# Patient Record
Sex: MALE | Race: WHITE | Employment: FULL TIME | URBAN - METROPOLITAN AREA
[De-identification: names, ages, dates, MRNs, and addresses within clinical notes are randomized per-mention and may not be internally consistent; named-entity substitution may affect disease eponyms.]

---

## 2017-01-06 ENCOUNTER — TELEPHONE (OUTPATIENT)
Dept: CARDIAC CATH/INVASIVE PROCEDURES | Age: 62
End: 2017-01-06

## 2017-01-09 ENCOUNTER — TELEPHONE (OUTPATIENT)
Dept: CARDIAC CATH/INVASIVE PROCEDURES | Age: 62
End: 2017-01-09

## 2017-01-10 ENCOUNTER — HOSPITAL ENCOUNTER (OUTPATIENT)
Dept: SURGERY | Age: 62
Discharge: HOME | End: 2017-01-10
Attending: INTERNAL MEDICINE | Admitting: INTERNAL MEDICINE

## 2017-01-10 ENCOUNTER — HOSPITAL ENCOUNTER (OUTPATIENT)
Dept: CARDIAC CATH/INVASIVE PROCEDURES | Age: 62
Discharge: HOME | End: 2017-01-10
Admitting: INTERNAL MEDICINE

## 2017-01-10 VITALS — BODY MASS INDEX: 24.91 KG/M2 | HEIGHT: 70 IN | WEIGHT: 174 LBS

## 2017-01-10 DIAGNOSIS — C90.00 MULTIPLE MYELOMA, REMISSION STATUS UNSPECIFIED (HCC): ICD-10-CM

## 2017-01-10 LAB
INR BLD: 0.95 (ref 0.85–1.16)
PROTHROMBIN TIME: 10.8 SEC (ref 9.8–13)

## 2017-01-10 RX ORDER — MAGNESIUM HYDROXIDE 1200 MG/15ML
150 LIQUID ORAL ONCE
Status: DISCONTINUED | OUTPATIENT
Start: 2017-01-10 | End: 2017-01-10 | Stop reason: CLARIF

## 2017-01-10 RX ORDER — FENTANYL CITRATE 50 UG/ML
50 INJECTION, SOLUTION INTRAMUSCULAR; INTRAVENOUS ONCE
Status: COMPLETED | OUTPATIENT
Start: 2017-01-10 | End: 2017-01-10

## 2017-01-10 RX ORDER — MIDAZOLAM HYDROCHLORIDE 1 MG/ML
1 INJECTION INTRAMUSCULAR; INTRAVENOUS ONCE
Status: COMPLETED | OUTPATIENT
Start: 2017-01-10 | End: 2017-01-10

## 2017-01-10 RX ADMIN — MIDAZOLAM HYDROCHLORIDE 1 MG: 1 INJECTION INTRAMUSCULAR; INTRAVENOUS at 13:45

## 2017-01-10 RX ADMIN — FENTANYL CITRATE 50 MCG: 50 INJECTION, SOLUTION INTRAMUSCULAR; INTRAVENOUS at 13:45

## 2017-01-10 ASSESSMENT — PAIN SCALES - GENERAL
PAINLEVEL_OUTOF10: 6
PAINLEVEL_OUTOF10: 6

## 2017-01-10 ASSESSMENT — PAIN DESCRIPTION - LOCATION: LOCATION: BACK

## 2017-01-11 ENCOUNTER — TELEPHONE (OUTPATIENT)
Dept: CARDIAC CATH/INVASIVE PROCEDURES | Age: 62
End: 2017-01-11

## 2017-01-19 RX ORDER — ONDANSETRON 2 MG/ML
8 INJECTION INTRAMUSCULAR; INTRAVENOUS ONCE
Status: CANCELLED
Start: 2017-01-19 | End: 2017-01-19

## 2017-01-31 ENCOUNTER — OFFICE VISIT (OUTPATIENT)
Dept: ENT CLINIC | Age: 62
End: 2017-01-31

## 2017-01-31 VITALS
TEMPERATURE: 98.4 F | BODY MASS INDEX: 24.34 KG/M2 | DIASTOLIC BLOOD PRESSURE: 74 MMHG | HEIGHT: 70 IN | WEIGHT: 170 LBS | SYSTOLIC BLOOD PRESSURE: 120 MMHG

## 2017-01-31 DIAGNOSIS — H90.2 MIDDLE EAR CONDUCTIVE HEARING LOSS: ICD-10-CM

## 2017-01-31 DIAGNOSIS — H65.92 MIDDLE EAR EFFUSION, LEFT: ICD-10-CM

## 2017-01-31 PROCEDURE — 69433 CREATE EARDRUM OPENING: CPT | Performed by: OTOLARYNGOLOGY

## 2017-02-09 ENCOUNTER — TELEPHONE (OUTPATIENT)
Dept: ENT CLINIC | Age: 62
End: 2017-02-09

## 2017-02-09 DIAGNOSIS — H65.192 OTHER ACUTE NONSUPPURATIVE OTITIS MEDIA OF LEFT EAR: Primary | ICD-10-CM

## 2017-02-10 DIAGNOSIS — H65.192 OTHER ACUTE NONSUPPURATIVE OTITIS MEDIA OF LEFT EAR: ICD-10-CM

## 2017-02-23 ENCOUNTER — TELEPHONE (OUTPATIENT)
Dept: ENT CLINIC | Age: 62
End: 2017-02-23

## 2017-02-23 ENCOUNTER — OFFICE VISIT (OUTPATIENT)
Dept: ENT CLINIC | Age: 62
End: 2017-02-23

## 2017-02-23 DIAGNOSIS — H92.12 PURULENT DRAINAGE THROUGH EAR TUBE, LEFT: ICD-10-CM

## 2017-02-23 DIAGNOSIS — H90.2 MIDDLE EAR CONDUCTIVE HEARING LOSS: ICD-10-CM

## 2017-02-23 DIAGNOSIS — H65.92 MIDDLE EAR EFFUSION, LEFT: Primary | ICD-10-CM

## 2017-02-23 PROCEDURE — 99212 OFFICE O/P EST SF 10 MIN: CPT | Performed by: OTOLARYNGOLOGY

## 2017-02-24 ENCOUNTER — OFFICE VISIT (OUTPATIENT)
Dept: ENT CLINIC | Age: 62
End: 2017-02-24

## 2017-02-24 DIAGNOSIS — H65.92 MIDDLE EAR EFFUSION, LEFT: Primary | ICD-10-CM

## 2017-02-24 PROCEDURE — 99024 POSTOP FOLLOW-UP VISIT: CPT | Performed by: OTOLARYNGOLOGY

## 2017-02-27 ENCOUNTER — TELEPHONE (OUTPATIENT)
Dept: ENT CLINIC | Age: 62
End: 2017-02-27

## 2017-03-09 PROBLEM — J32.1 SINUSITIS CHRONIC, FRONTAL: Status: ACTIVE | Noted: 2017-03-09

## 2017-03-09 PROBLEM — Z00.6 EXAMINATION OF PARTICIPANT IN CLINICAL TRIAL: Status: ACTIVE | Noted: 2017-03-09

## 2017-03-10 ENCOUNTER — OFFICE VISIT (OUTPATIENT)
Dept: PULMONOLOGY | Age: 62
End: 2017-03-10

## 2017-03-10 ENCOUNTER — HOSPITAL ENCOUNTER (OUTPATIENT)
Dept: CT IMAGING | Age: 62
Discharge: HOME | End: 2017-03-10
Attending: INTERNAL MEDICINE | Admitting: INTERNAL MEDICINE

## 2017-03-10 VITALS
BODY MASS INDEX: 23.8 KG/M2 | SYSTOLIC BLOOD PRESSURE: 160 MMHG | DIASTOLIC BLOOD PRESSURE: 92 MMHG | HEART RATE: 113 BPM | RESPIRATION RATE: 16 BRPM | OXYGEN SATURATION: 100 % | WEIGHT: 170 LBS | HEIGHT: 71 IN

## 2017-03-10 DIAGNOSIS — J32.1 CHRONIC FRONTAL SINUSITIS: ICD-10-CM

## 2017-03-10 DIAGNOSIS — C90.00 MULTIPLE MYELOMA, WITHOUT MENTION OF HAVING ACHIEVED REMISSION: ICD-10-CM

## 2017-03-10 DIAGNOSIS — Z94.81 STATUS POST AUTOLOGOUS BONE MARROW TRANSPLANTATION (HCC): ICD-10-CM

## 2017-03-10 DIAGNOSIS — J32.1 SINUSITIS CHRONIC, FRONTAL: ICD-10-CM

## 2017-03-10 DIAGNOSIS — J21.9 BRONCHIOLITIS: Primary | ICD-10-CM

## 2017-03-10 PROCEDURE — 99213 OFFICE O/P EST LOW 20 MIN: CPT | Performed by: INTERNAL MEDICINE

## 2017-03-10 RX ORDER — ALBUTEROL SULFATE 90 UG/1
2 AEROSOL, METERED RESPIRATORY (INHALATION) EVERY 4 HOURS PRN
Qty: 1 INHALER | Refills: 5 | Status: SHIPPED | OUTPATIENT
Start: 2017-03-10 | End: 2018-04-20

## 2017-03-10 RX ORDER — INHALER, ASSIST DEVICES
SPACER (EA) MISCELLANEOUS
Qty: 1 EACH | Refills: 3 | Status: SHIPPED | OUTPATIENT
Start: 2017-03-10 | End: 2018-01-01 | Stop reason: SDUPTHER

## 2017-04-11 ENCOUNTER — HOSPITAL ENCOUNTER (OUTPATIENT)
Dept: ONCOLOGY | Age: 62
Discharge: HOME | End: 2017-04-30
Attending: INTERNAL MEDICINE | Admitting: INTERNAL MEDICINE

## 2017-04-11 VITALS
DIASTOLIC BLOOD PRESSURE: 95 MMHG | RESPIRATION RATE: 20 BRPM | HEART RATE: 93 BPM | TEMPERATURE: 98 F | SYSTOLIC BLOOD PRESSURE: 139 MMHG | OXYGEN SATURATION: 99 %

## 2017-04-11 LAB
BASOPHILS ABSOLUTE: 0 K/UL (ref 0–0.2)
BASOPHILS RELATIVE PERCENT: 1.1 %
EOSINOPHILS ABSOLUTE: 0 K/UL (ref 0–0.6)
EOSINOPHILS RELATIVE PERCENT: 1.4 %
HCT VFR BLD CALC: 35.3 % (ref 40.5–52.5)
HEMOGLOBIN: 12.1 G/DL (ref 13.5–17.5)
LYMPHOCYTES ABSOLUTE: 0.1 K/UL (ref 1–5.1)
LYMPHOCYTES RELATIVE PERCENT: 8.1 %
MCH RBC QN AUTO: 37.5 PG (ref 26–34)
MCHC RBC AUTO-ENTMCNC: 34.3 G/DL (ref 31–36)
MCV RBC AUTO: 109.3 FL (ref 80–100)
MONOCYTES ABSOLUTE: 0.3 K/UL (ref 0–1.3)
MONOCYTES RELATIVE PERCENT: 21.3 %
NEUTROPHILS ABSOLUTE: 1.1 K/UL (ref 1.7–7.7)
NEUTROPHILS RELATIVE PERCENT: 68.1 %
PDW BLD-RTO: 18.4 % (ref 12.4–15.4)
PLATELET # BLD: 61 K/UL (ref 135–450)
PMV BLD AUTO: 7.3 FL (ref 5–10.5)
RBC # BLD: 3.23 M/UL (ref 4.2–5.9)
WBC # BLD: 1.6 K/UL (ref 4–11)

## 2017-04-11 RX ORDER — LIDOCAINE HYDROCHLORIDE 10 MG/ML
5 INJECTION, SOLUTION EPIDURAL; INFILTRATION; INTRACAUDAL; PERINEURAL ONCE
Status: COMPLETED | OUTPATIENT
Start: 2017-04-11 | End: 2017-04-11

## 2017-04-11 RX ORDER — FENTANYL CITRATE 50 UG/ML
50 INJECTION, SOLUTION INTRAMUSCULAR; INTRAVENOUS ONCE
Status: COMPLETED | OUTPATIENT
Start: 2017-04-11 | End: 2017-04-11

## 2017-04-11 RX ORDER — MIDAZOLAM HYDROCHLORIDE 1 MG/ML
2 INJECTION INTRAMUSCULAR; INTRAVENOUS ONCE
Status: COMPLETED | OUTPATIENT
Start: 2017-04-11 | End: 2017-04-11

## 2017-04-11 RX ORDER — FLUMAZENIL 0.1 MG/ML
0.5 INJECTION, SOLUTION INTRAVENOUS PRN
Status: DISCONTINUED | OUTPATIENT
Start: 2017-04-11 | End: 2017-05-01 | Stop reason: HOSPADM

## 2017-04-11 RX ORDER — NALOXONE HYDROCHLORIDE 0.4 MG/ML
0.4 INJECTION, SOLUTION INTRAMUSCULAR; INTRAVENOUS; SUBCUTANEOUS PRN
Status: DISCONTINUED | OUTPATIENT
Start: 2017-04-11 | End: 2017-05-01 | Stop reason: HOSPADM

## 2017-04-11 RX ADMIN — MIDAZOLAM HYDROCHLORIDE 2 MG: 1 INJECTION INTRAMUSCULAR; INTRAVENOUS at 12:37

## 2017-04-11 RX ADMIN — FENTANYL CITRATE 50 MCG: 50 INJECTION, SOLUTION INTRAMUSCULAR; INTRAVENOUS at 12:40

## 2017-04-11 RX ADMIN — LIDOCAINE HYDROCHLORIDE 5 ML: 10 INJECTION, SOLUTION EPIDURAL; INFILTRATION; INTRACAUDAL; PERINEURAL at 12:40

## 2017-04-11 ASSESSMENT — PAIN SCALES - GENERAL: PAINLEVEL_OUTOF10: 0

## 2017-05-15 ENCOUNTER — HOSPITAL ENCOUNTER (OUTPATIENT)
Dept: OTHER | Age: 62
Discharge: HOME | End: 2017-05-15
Attending: NURSE PRACTITIONER | Admitting: NURSE PRACTITIONER

## 2017-05-15 DIAGNOSIS — R05.9 COUGH: ICD-10-CM

## 2017-05-15 DIAGNOSIS — R06.02 SOB (SHORTNESS OF BREATH): ICD-10-CM

## 2017-05-16 ENCOUNTER — OFFICE VISIT (OUTPATIENT)
Dept: PULMONOLOGY | Age: 62
End: 2017-05-16

## 2017-05-16 VITALS
DIASTOLIC BLOOD PRESSURE: 70 MMHG | WEIGHT: 163 LBS | RESPIRATION RATE: 16 BRPM | OXYGEN SATURATION: 96 % | BODY MASS INDEX: 23.34 KG/M2 | HEIGHT: 70 IN | SYSTOLIC BLOOD PRESSURE: 104 MMHG | HEART RATE: 106 BPM

## 2017-05-16 DIAGNOSIS — C90.01 MULTIPLE MYELOMA IN REMISSION (HCC): ICD-10-CM

## 2017-05-16 DIAGNOSIS — J21.9 BRONCHIOLITIS: Primary | ICD-10-CM

## 2017-05-16 DIAGNOSIS — C90.00 MULTIPLE MYELOMA, WITHOUT MENTION OF HAVING ACHIEVED REMISSION: ICD-10-CM

## 2017-05-16 DIAGNOSIS — G47.33 OSA (OBSTRUCTIVE SLEEP APNEA): Chronic | ICD-10-CM

## 2017-05-16 PROCEDURE — 99214 OFFICE O/P EST MOD 30 MIN: CPT | Performed by: INTERNAL MEDICINE

## 2017-05-16 RX ORDER — PREDNISONE 10 MG/1
TABLET ORAL
Qty: 100 TABLET | Refills: 3 | Status: ON HOLD | OUTPATIENT
Start: 2017-05-16 | End: 2018-01-01 | Stop reason: HOSPADM

## 2017-05-31 ENCOUNTER — OFFICE VISIT (OUTPATIENT)
Dept: PSYCHIATRY | Age: 62
End: 2017-05-31

## 2017-05-31 VITALS
HEART RATE: 110 BPM | OXYGEN SATURATION: 99 % | DIASTOLIC BLOOD PRESSURE: 80 MMHG | BODY MASS INDEX: 23.99 KG/M2 | SYSTOLIC BLOOD PRESSURE: 150 MMHG | HEIGHT: 70 IN | WEIGHT: 167.6 LBS

## 2017-05-31 DIAGNOSIS — G47.00 INSOMNIA, UNSPECIFIED TYPE: ICD-10-CM

## 2017-05-31 DIAGNOSIS — F43.23 ADJUSTMENT DISORDER WITH MIXED ANXIETY AND DEPRESSED MOOD: Primary | ICD-10-CM

## 2017-05-31 PROCEDURE — 99204 OFFICE O/P NEW MOD 45 MIN: CPT | Performed by: PSYCHIATRY & NEUROLOGY

## 2017-05-31 RX ORDER — BUPROPION HYDROCHLORIDE 100 MG/1
100 TABLET ORAL 2 TIMES DAILY
Qty: 60 TABLET | Refills: 1 | Status: SHIPPED | OUTPATIENT
Start: 2017-05-31 | End: 2017-06-28 | Stop reason: SDUPTHER

## 2017-06-01 ENCOUNTER — OFFICE VISIT (OUTPATIENT)
Dept: PULMONOLOGY | Age: 62
End: 2017-06-01

## 2017-06-01 VITALS
HEART RATE: 118 BPM | DIASTOLIC BLOOD PRESSURE: 80 MMHG | SYSTOLIC BLOOD PRESSURE: 142 MMHG | OXYGEN SATURATION: 97 % | WEIGHT: 166.8 LBS | BODY MASS INDEX: 23.88 KG/M2 | HEIGHT: 70 IN | RESPIRATION RATE: 18 BRPM

## 2017-06-01 DIAGNOSIS — G47.33 OSA (OBSTRUCTIVE SLEEP APNEA): Chronic | ICD-10-CM

## 2017-06-01 DIAGNOSIS — J21.9 BRONCHIOLITIS: Primary | ICD-10-CM

## 2017-06-01 DIAGNOSIS — Z94.81 STATUS POST AUTOLOGOUS BONE MARROW TRANSPLANTATION (HCC): ICD-10-CM

## 2017-06-01 DIAGNOSIS — C90.01 MULTIPLE MYELOMA IN REMISSION (HCC): ICD-10-CM

## 2017-06-01 PROCEDURE — 99214 OFFICE O/P EST MOD 30 MIN: CPT | Performed by: INTERNAL MEDICINE

## 2017-06-01 RX ORDER — FLUTICASONE FUROATE AND VILANTEROL 100; 25 UG/1; UG/1
1 POWDER RESPIRATORY (INHALATION) DAILY
Qty: 3 EACH | Refills: 3 | Status: SHIPPED | OUTPATIENT
Start: 2017-06-01 | End: 2017-08-04 | Stop reason: SDUPTHER

## 2017-06-01 RX ORDER — MOMETASONE FUROATE 50 UG/1
2 SPRAY, METERED NASAL 2 TIMES DAILY
Qty: 3 INHALER | Refills: 3 | Status: SHIPPED | OUTPATIENT
Start: 2017-06-01 | End: 2017-08-24 | Stop reason: SDUPTHER

## 2017-06-08 ENCOUNTER — PATIENT MESSAGE (OUTPATIENT)
Dept: PSYCHIATRY | Age: 62
End: 2017-06-08

## 2017-06-09 RX ORDER — AZITHROMYCIN 500 MG/1
500 TABLET, FILM COATED ORAL DAILY
Qty: 6 TABLET | Refills: 0 | Status: SHIPPED | OUTPATIENT
Start: 2017-06-09 | End: 2017-06-15 | Stop reason: ALTCHOICE

## 2017-06-14 ENCOUNTER — TELEPHONE (OUTPATIENT)
Dept: PULMONOLOGY | Age: 62
End: 2017-06-14

## 2017-06-15 ENCOUNTER — TELEPHONE (OUTPATIENT)
Dept: PULMONOLOGY | Age: 62
End: 2017-06-15

## 2017-06-20 ENCOUNTER — OFFICE VISIT (OUTPATIENT)
Dept: CARDIOLOGY CLINIC | Age: 62
End: 2017-06-20

## 2017-06-20 VITALS
SYSTOLIC BLOOD PRESSURE: 144 MMHG | HEIGHT: 70 IN | DIASTOLIC BLOOD PRESSURE: 94 MMHG | HEART RATE: 116 BPM | WEIGHT: 168 LBS | BODY MASS INDEX: 24.05 KG/M2

## 2017-06-20 DIAGNOSIS — E78.00 HYPERCHOLESTEREMIA: Chronic | ICD-10-CM

## 2017-06-20 DIAGNOSIS — R06.02 SOB (SHORTNESS OF BREATH): ICD-10-CM

## 2017-06-20 DIAGNOSIS — I10 ESSENTIAL HYPERTENSION: Primary | ICD-10-CM

## 2017-06-20 DIAGNOSIS — I10 ESSENTIAL HYPERTENSION: Chronic | ICD-10-CM

## 2017-06-20 PROCEDURE — 93000 ELECTROCARDIOGRAM COMPLETE: CPT | Performed by: INTERNAL MEDICINE

## 2017-06-20 PROCEDURE — 99205 OFFICE O/P NEW HI 60 MIN: CPT | Performed by: INTERNAL MEDICINE

## 2017-06-21 ENCOUNTER — OFFICE VISIT (OUTPATIENT)
Dept: PULMONOLOGY | Age: 62
End: 2017-06-21

## 2017-06-21 VITALS
DIASTOLIC BLOOD PRESSURE: 60 MMHG | OXYGEN SATURATION: 97 % | BODY MASS INDEX: 24.25 KG/M2 | HEIGHT: 70 IN | RESPIRATION RATE: 20 BRPM | SYSTOLIC BLOOD PRESSURE: 142 MMHG | WEIGHT: 169.4 LBS | HEART RATE: 126 BPM

## 2017-06-21 DIAGNOSIS — Z94.81 STATUS POST AUTOLOGOUS BONE MARROW TRANSPLANTATION (HCC): ICD-10-CM

## 2017-06-21 DIAGNOSIS — J21.9 BRONCHIOLITIS: Primary | ICD-10-CM

## 2017-06-21 DIAGNOSIS — C90.00 MULTIPLE MYELOMA, WITHOUT MENTION OF HAVING ACHIEVED REMISSION: ICD-10-CM

## 2017-06-21 PROCEDURE — 99213 OFFICE O/P EST LOW 20 MIN: CPT | Performed by: INTERNAL MEDICINE

## 2017-06-28 ENCOUNTER — OFFICE VISIT (OUTPATIENT)
Dept: PSYCHIATRY | Age: 62
End: 2017-06-28

## 2017-06-28 ENCOUNTER — HOSPITAL ENCOUNTER (OUTPATIENT)
Dept: CT IMAGING | Age: 62
Discharge: HOME | End: 2017-06-28
Attending: INTERNAL MEDICINE | Admitting: INTERNAL MEDICINE

## 2017-06-28 VITALS
SYSTOLIC BLOOD PRESSURE: 140 MMHG | HEIGHT: 70 IN | DIASTOLIC BLOOD PRESSURE: 78 MMHG | BODY MASS INDEX: 24.37 KG/M2 | HEART RATE: 112 BPM | WEIGHT: 170.2 LBS

## 2017-06-28 DIAGNOSIS — F43.23 ADJUSTMENT DISORDER WITH MIXED ANXIETY AND DEPRESSED MOOD: Primary | ICD-10-CM

## 2017-06-28 DIAGNOSIS — G47.00 INSOMNIA, UNSPECIFIED TYPE: ICD-10-CM

## 2017-06-28 DIAGNOSIS — J21.9 BRONCHIOLITIS: ICD-10-CM

## 2017-06-28 DIAGNOSIS — J21.9 ACUTE BRONCHIOLITIS: ICD-10-CM

## 2017-06-28 PROCEDURE — 99214 OFFICE O/P EST MOD 30 MIN: CPT | Performed by: PSYCHIATRY & NEUROLOGY

## 2017-06-28 RX ORDER — BUPROPION HYDROCHLORIDE 100 MG/1
100 TABLET ORAL 2 TIMES DAILY
Qty: 60 TABLET | Refills: 1 | Status: SHIPPED | OUTPATIENT
Start: 2017-06-28 | End: 2017-08-01 | Stop reason: SDUPTHER

## 2017-06-29 ENCOUNTER — TELEPHONE (OUTPATIENT)
Dept: CARDIOLOGY CLINIC | Age: 62
End: 2017-06-29

## 2017-06-29 ENCOUNTER — OFFICE VISIT (OUTPATIENT)
Dept: PULMONOLOGY | Age: 62
End: 2017-06-29

## 2017-06-29 ENCOUNTER — HOSPITAL ENCOUNTER (OUTPATIENT)
Dept: NON INVASIVE DIAGNOSTICS | Age: 62
Discharge: HOME | End: 2017-06-29
Attending: FAMILY MEDICINE | Admitting: INTERNAL MEDICINE

## 2017-06-29 VITALS
SYSTOLIC BLOOD PRESSURE: 142 MMHG | OXYGEN SATURATION: 97 % | BODY MASS INDEX: 24.17 KG/M2 | HEART RATE: 115 BPM | HEIGHT: 70 IN | RESPIRATION RATE: 18 BRPM | DIASTOLIC BLOOD PRESSURE: 70 MMHG | WEIGHT: 168.8 LBS

## 2017-06-29 DIAGNOSIS — E78.00 HYPERCHOLESTEREMIA: ICD-10-CM

## 2017-06-29 DIAGNOSIS — J21.9 BRONCHIOLITIS: Primary | ICD-10-CM

## 2017-06-29 DIAGNOSIS — R06.02 SOB (SHORTNESS OF BREATH): ICD-10-CM

## 2017-06-29 DIAGNOSIS — I10 ESSENTIAL (PRIMARY) HYPERTENSION: ICD-10-CM

## 2017-06-29 DIAGNOSIS — I10 ESSENTIAL HYPERTENSION: ICD-10-CM

## 2017-06-29 DIAGNOSIS — C90.01 MULTIPLE MYELOMA IN REMISSION (HCC): ICD-10-CM

## 2017-06-29 LAB
LV EF: 63 %
LV EF: 64 %
LVEF MODALITY: NORMAL
LVEF MODALITY: NORMAL

## 2017-06-29 PROCEDURE — 99214 OFFICE O/P EST MOD 30 MIN: CPT | Performed by: INTERNAL MEDICINE

## 2017-07-05 ENCOUNTER — OFFICE VISIT (OUTPATIENT)
Dept: CARDIOLOGY CLINIC | Age: 62
End: 2017-07-05

## 2017-07-05 VITALS
HEART RATE: 119 BPM | WEIGHT: 167.8 LBS | DIASTOLIC BLOOD PRESSURE: 80 MMHG | BODY MASS INDEX: 24.08 KG/M2 | SYSTOLIC BLOOD PRESSURE: 118 MMHG

## 2017-07-05 DIAGNOSIS — R00.0 TACHYCARDIA: Primary | ICD-10-CM

## 2017-07-05 PROCEDURE — 93000 ELECTROCARDIOGRAM COMPLETE: CPT | Performed by: INTERNAL MEDICINE

## 2017-07-05 PROCEDURE — 99214 OFFICE O/P EST MOD 30 MIN: CPT | Performed by: INTERNAL MEDICINE

## 2017-07-06 ENCOUNTER — OFFICE VISIT (OUTPATIENT)
Dept: FAMILY MEDICINE CLINIC | Age: 62
End: 2017-07-06

## 2017-07-06 VITALS
HEART RATE: 118 BPM | DIASTOLIC BLOOD PRESSURE: 84 MMHG | SYSTOLIC BLOOD PRESSURE: 126 MMHG | BODY MASS INDEX: 24.13 KG/M2 | TEMPERATURE: 98 F | WEIGHT: 168.2 LBS | RESPIRATION RATE: 14 BRPM | OXYGEN SATURATION: 95 %

## 2017-07-06 DIAGNOSIS — I10 ESSENTIAL HYPERTENSION: ICD-10-CM

## 2017-07-06 DIAGNOSIS — M79.7 FIBROMYALGIA: Chronic | ICD-10-CM

## 2017-07-06 DIAGNOSIS — R73.9 HYPERGLYCEMIA: ICD-10-CM

## 2017-07-06 DIAGNOSIS — E78.00 HYPERCHOLESTEREMIA: Chronic | ICD-10-CM

## 2017-07-06 DIAGNOSIS — R00.0 TACHYCARDIA: Primary | ICD-10-CM

## 2017-07-06 DIAGNOSIS — C90.00 MULTIPLE MYELOMA, WITHOUT MENTION OF HAVING ACHIEVED REMISSION: ICD-10-CM

## 2017-07-06 LAB
ANION GAP SERPL CALCULATED.3IONS-SCNC: 14 MMOL/L (ref 3–16)
BUN BLDV-MCNC: 29 MG/DL (ref 7–20)
CALCIUM SERPL-MCNC: 9 MG/DL (ref 8.3–10.6)
CHLORIDE BLD-SCNC: 102 MMOL/L (ref 99–110)
CO2: 24 MMOL/L (ref 21–32)
CREAT SERPL-MCNC: 1.7 MG/DL (ref 0.8–1.3)
FOLATE: >20 NG/ML (ref 4.78–24.2)
GFR AFRICAN AMERICAN: 50
GFR NON-AFRICAN AMERICAN: 41
GLUCOSE BLD-MCNC: 102 MG/DL (ref 70–99)
POTASSIUM SERPL-SCNC: 4 MMOL/L (ref 3.5–5.1)
SODIUM BLD-SCNC: 140 MMOL/L (ref 136–145)
T4 FREE: 1.3 NG/DL (ref 0.9–1.8)
TSH SERPL DL<=0.05 MIU/L-ACNC: 2.82 UIU/ML (ref 0.27–4.2)
VITAMIN B-12: 1439 PG/ML (ref 211–911)

## 2017-07-06 PROCEDURE — 99214 OFFICE O/P EST MOD 30 MIN: CPT | Performed by: FAMILY MEDICINE

## 2017-07-06 RX ORDER — DOXEPIN HYDROCHLORIDE 3 MG/1
TABLET ORAL
Status: ON HOLD | COMMUNITY
End: 2018-01-01 | Stop reason: HOSPADM

## 2017-07-06 RX ORDER — LANOLIN ALCOHOL/MO/W.PET/CERES
400 CREAM (GRAM) TOPICAL DAILY
COMMUNITY

## 2017-07-06 RX ORDER — GUAIFENESIN 400 MG/1
400 TABLET ORAL 4 TIMES DAILY PRN
Status: ON HOLD | COMMUNITY
End: 2018-01-01 | Stop reason: HOSPADM

## 2017-07-06 RX ORDER — CHOLECALCIFEROL (VITAMIN D3) 25 MCG
TABLET,CHEWABLE ORAL
COMMUNITY

## 2017-07-06 RX ORDER — EPINASTINE HCL 0.05 %
DROPS OPHTHALMIC (EYE)
COMMUNITY
Start: 2017-05-22 | End: 2018-02-28 | Stop reason: ALTCHOICE

## 2017-07-07 ENCOUNTER — PATIENT MESSAGE (OUTPATIENT)
Dept: FAMILY MEDICINE CLINIC | Age: 62
End: 2017-07-07

## 2017-07-07 LAB
ESTIMATED AVERAGE GLUCOSE: 125.5 MG/DL
HBA1C MFR BLD: 6 %

## 2017-07-10 RX ORDER — DOXAZOSIN MESYLATE 1 MG/1
1 TABLET ORAL DAILY
Qty: 30 TABLET | Refills: 3 | Status: SHIPPED | OUTPATIENT
Start: 2017-07-10 | End: 2018-02-28 | Stop reason: ALTCHOICE

## 2017-07-18 ENCOUNTER — TELEPHONE (OUTPATIENT)
Dept: PULMONOLOGY | Age: 62
End: 2017-07-18

## 2017-07-19 ENCOUNTER — TELEPHONE (OUTPATIENT)
Dept: PULMONOLOGY | Age: 62
End: 2017-07-19

## 2017-07-19 RX ORDER — ALBUTEROL SULFATE 2.5 MG/3ML
SOLUTION RESPIRATORY (INHALATION)
Qty: 120 EACH | Refills: 3 | Status: SHIPPED | OUTPATIENT
Start: 2017-07-19 | End: 2017-08-24 | Stop reason: SDUPTHER

## 2017-07-27 ENCOUNTER — PATIENT MESSAGE (OUTPATIENT)
Dept: PSYCHIATRY | Age: 62
End: 2017-07-27

## 2017-07-28 ENCOUNTER — TELEPHONE (OUTPATIENT)
Dept: PULMONOLOGY | Age: 62
End: 2017-07-28

## 2017-08-01 RX ORDER — BUPROPION HYDROCHLORIDE 100 MG/1
100 TABLET ORAL 2 TIMES DAILY
Qty: 180 TABLET | Refills: 0 | Status: SHIPPED | OUTPATIENT
Start: 2017-08-01 | End: 2017-10-19 | Stop reason: SDUPTHER

## 2017-08-03 DIAGNOSIS — E78.00 HYPERCHOLESTEREMIA: Chronic | ICD-10-CM

## 2017-08-03 DIAGNOSIS — F41.9 ANXIETY AND DEPRESSION: Chronic | ICD-10-CM

## 2017-08-03 DIAGNOSIS — F32.A ANXIETY AND DEPRESSION: Chronic | ICD-10-CM

## 2017-08-03 DIAGNOSIS — I10 ESSENTIAL HYPERTENSION: ICD-10-CM

## 2017-08-04 RX ORDER — ISOSORBIDE MONONITRATE 10 MG/1
TABLET ORAL
Qty: 270 TABLET | Refills: 1 | Status: SHIPPED | OUTPATIENT
Start: 2017-08-04 | End: 2018-02-13 | Stop reason: SDUPTHER

## 2017-08-04 RX ORDER — CLONAZEPAM 0.5 MG/1
1-2 TABLET ORAL 3 TIMES DAILY PRN
Qty: 270 TABLET | Refills: 1 | Status: SHIPPED | OUTPATIENT
Start: 2017-08-04 | End: 2018-02-13 | Stop reason: SDUPTHER

## 2017-08-04 RX ORDER — OMEGA-3-ACID ETHYL ESTERS 1 G/1
2 CAPSULE, LIQUID FILLED ORAL 2 TIMES DAILY
Qty: 360 CAPSULE | Refills: 1 | Status: ON HOLD | OUTPATIENT
Start: 2017-08-04 | End: 2018-01-01 | Stop reason: HOSPADM

## 2017-08-04 RX ORDER — CLONIDINE HYDROCHLORIDE 0.1 MG/1
0.05 TABLET ORAL 2 TIMES DAILY
Qty: 90 TABLET | Refills: 1 | Status: SHIPPED | OUTPATIENT
Start: 2017-08-04 | End: 2018-02-13 | Stop reason: SDUPTHER

## 2017-08-04 RX ORDER — FLUTICASONE FUROATE AND VILANTEROL 100; 25 UG/1; UG/1
1 POWDER RESPIRATORY (INHALATION) DAILY
Qty: 3 EACH | Refills: 3 | Status: SHIPPED | OUTPATIENT
Start: 2017-08-04 | End: 2017-08-24 | Stop reason: SDUPTHER

## 2017-08-18 ENCOUNTER — TELEPHONE (OUTPATIENT)
Dept: FAMILY MEDICINE CLINIC | Age: 62
End: 2017-08-18

## 2017-08-24 ENCOUNTER — OFFICE VISIT (OUTPATIENT)
Dept: PULMONOLOGY | Age: 62
End: 2017-08-24

## 2017-08-24 VITALS
OXYGEN SATURATION: 98 % | BODY MASS INDEX: 23.88 KG/M2 | HEART RATE: 93 BPM | SYSTOLIC BLOOD PRESSURE: 130 MMHG | HEIGHT: 70 IN | DIASTOLIC BLOOD PRESSURE: 70 MMHG | RESPIRATION RATE: 18 BRPM | WEIGHT: 166.8 LBS

## 2017-08-24 DIAGNOSIS — J21.9 BRONCHIOLITIS: Primary | ICD-10-CM

## 2017-08-24 DIAGNOSIS — C90.01 MULTIPLE MYELOMA IN REMISSION (HCC): ICD-10-CM

## 2017-08-24 PROCEDURE — 99214 OFFICE O/P EST MOD 30 MIN: CPT | Performed by: INTERNAL MEDICINE

## 2017-08-24 RX ORDER — FLUTICASONE FUROATE AND VILANTEROL 100; 25 UG/1; UG/1
1 POWDER RESPIRATORY (INHALATION) DAILY
Qty: 3 EACH | Refills: 3 | Status: SHIPPED | OUTPATIENT
Start: 2017-08-24 | End: 2018-03-29 | Stop reason: SDUPTHER

## 2017-08-24 RX ORDER — MOMETASONE FUROATE 50 UG/1
2 SPRAY, METERED NASAL 2 TIMES DAILY
Qty: 3 INHALER | Refills: 3 | Status: SHIPPED | OUTPATIENT
Start: 2017-08-24 | End: 2017-10-19 | Stop reason: SDUPTHER

## 2017-08-24 RX ORDER — ALBUTEROL SULFATE 2.5 MG/3ML
SOLUTION RESPIRATORY (INHALATION)
Qty: 120 EACH | Refills: 3 | Status: SHIPPED | OUTPATIENT
Start: 2017-08-24 | End: 2018-03-29 | Stop reason: SDUPTHER

## 2017-08-24 RX ORDER — AZITHROMYCIN 500 MG/1
TABLET, FILM COATED ORAL
Qty: 5 TABLET | Refills: 0 | Status: ON HOLD | OUTPATIENT
Start: 2017-08-24 | End: 2018-01-01 | Stop reason: HOSPADM

## 2017-08-29 RX ORDER — SODIUM CHLORIDE FOR INHALATION 3 %
VIAL, NEBULIZER (ML) INHALATION
Qty: 480 ML | Refills: 11 | Status: SHIPPED | OUTPATIENT
Start: 2017-08-29

## 2017-10-05 ENCOUNTER — TELEPHONE (OUTPATIENT)
Dept: PULMONOLOGY | Age: 62
End: 2017-10-05

## 2017-10-05 NOTE — TELEPHONE ENCOUNTER
Patient would like a flu shot, and would like to inquire about a pneumonia shot.  Looks like he had Prev 13 in 2015 and pneumovax 23 in 2015

## 2017-10-05 NOTE — TELEPHONE ENCOUNTER
Pneumonia vaccine does not need to be repeated. He may come by and get a flu shot here, or his primary care office, or local pharmacy.

## 2017-10-09 ENCOUNTER — TELEPHONE (OUTPATIENT)
Dept: PULMONOLOGY | Age: 62
End: 2017-10-09

## 2017-10-09 ENCOUNTER — HOSPITAL ENCOUNTER (OUTPATIENT)
Dept: OTHER | Age: 62
Discharge: HOME | End: 2017-10-09
Attending: INTERNAL MEDICINE | Admitting: INTERNAL MEDICINE

## 2017-10-09 DIAGNOSIS — E85.9 MYELOMA ASSOCIATED AMYLOIDOSIS (HCC): ICD-10-CM

## 2017-10-09 DIAGNOSIS — M54.9 BACK PAIN, UNSPECIFIED BACK LOCATION, UNSPECIFIED BACK PAIN LATERALITY, UNSPECIFIED CHRONICITY: ICD-10-CM

## 2017-10-09 DIAGNOSIS — C90.00 MYELOMA ASSOCIATED AMYLOIDOSIS (HCC): ICD-10-CM

## 2017-10-12 ENCOUNTER — NURSE ONLY (OUTPATIENT)
Dept: PULMONOLOGY | Age: 62
End: 2017-10-12

## 2017-10-12 DIAGNOSIS — Z23 NEEDS FLU SHOT: Primary | ICD-10-CM

## 2017-10-12 PROCEDURE — 90630 INFLUENZA, QUADV, 18-64 YRS, ID, PF, MICRO INJ, 0.1ML (FLUZONE QUADV, PF): CPT | Performed by: INTERNAL MEDICINE

## 2017-10-12 PROCEDURE — 90471 IMMUNIZATION ADMIN: CPT | Performed by: INTERNAL MEDICINE

## 2017-10-12 NOTE — PROGRESS NOTES
Vaccine Information Sheet, \"Influenza - Inactivated\"  given to Deondre Romo, or parent/legal guardian of  Deondre Romo and verbalized understanding. Patient responses:    Have you ever had a reaction to a flu vaccine? No  Are you able to eat eggs without adverse effects? No  Do you have any current illness? No  Have you ever had Guillian [place] Syndrome? No    Flu vaccine given per order. Please see immunization tab. Patient came in for Flu shot. Flu shot ok'd by .

## 2017-10-15 DIAGNOSIS — F41.9 ANXIETY AND DEPRESSION: Chronic | ICD-10-CM

## 2017-10-15 DIAGNOSIS — F32.A ANXIETY AND DEPRESSION: Chronic | ICD-10-CM

## 2017-10-16 RX ORDER — BUPROPION HYDROCHLORIDE 75 MG/1
TABLET ORAL
Qty: 180 TABLET | Refills: 3 | Status: SHIPPED | OUTPATIENT
Start: 2017-10-16 | End: 2017-10-19

## 2017-10-19 NOTE — TELEPHONE ENCOUNTER
From: Pippa Smith  Sent: 10/19/2017 8:43 AM EDT  Subject: Medication Renewal Request    Pippa Luis would like a refill of the following medications:  mometasone (NASONEX) 50 MCG/ACT nasal spray Rishi Barger MD]    Preferred pharmacy: [address] [place] [address] , [address] [facility] [phone] - F [phone]    Comment:  [facility]? [facility] Federico Matias a request to approve this. they have asent me notice that there has not been a response. I know Luis Gamez is very busy and I am sure that your office can take care of this.  Thank you Quiana Finch the Nasanex must be marked generic Sandoz only !!! they will send another generic that does not work

## 2017-10-20 RX ORDER — MOMETASONE FUROATE 50 UG/1
2 SPRAY, METERED NASAL 2 TIMES DAILY
Qty: 3 INHALER | Refills: 3 | Status: SHIPPED | OUTPATIENT
Start: 2017-10-20 | End: 2018-03-29 | Stop reason: SDUPTHER

## 2017-11-14 ENCOUNTER — TELEPHONE (OUTPATIENT)
Dept: ENT CLINIC | Age: 62
End: 2017-11-14

## 2017-11-16 ENCOUNTER — TELEPHONE (OUTPATIENT)
Dept: ENT CLINIC | Age: 62
End: 2017-11-16

## 2017-11-16 RX ORDER — AZITHROMYCIN 250 MG/1
TABLET, FILM COATED ORAL
Qty: 1 PACKET | Refills: 0 | Status: CANCELLED | OUTPATIENT
Start: 2017-11-16 | End: 2017-11-26

## 2017-11-20 ENCOUNTER — TELEPHONE (OUTPATIENT)
Dept: ENT CLINIC | Age: 62
End: 2017-11-20

## 2017-11-21 ENCOUNTER — OFFICE VISIT (OUTPATIENT)
Dept: ENT CLINIC | Age: 62
End: 2017-11-21

## 2017-11-21 VITALS
WEIGHT: 163.4 LBS | BODY MASS INDEX: 23.39 KG/M2 | HEIGHT: 70 IN | DIASTOLIC BLOOD PRESSURE: 99 MMHG | SYSTOLIC BLOOD PRESSURE: 157 MMHG | TEMPERATURE: 98.2 F | HEART RATE: 131 BPM

## 2017-11-21 DIAGNOSIS — R42 DIZZINESS: Primary | ICD-10-CM

## 2017-11-21 DIAGNOSIS — R51.9 FACIAL PAIN: ICD-10-CM

## 2017-11-21 PROCEDURE — 99213 OFFICE O/P EST LOW 20 MIN: CPT | Performed by: OTOLARYNGOLOGY

## 2017-11-21 NOTE — PROGRESS NOTES
FOLLOW UP VISIT:      INTERIM HISTORY: I had placed a tube in his left ear in the past for middle ear effusion. Patient now has some dizziness of several days duration. He does not notice a change in his hearing. He also feels some sinus symptoms of bilateral facial pain and some throat pain. He has been on a Z-Girish but his symptoms persist he is undergoing chemotherapy at the current time for multiple myeloma. PAST MEDICAL HISTORY:   History   Smoking Status    Never Smoker   Smokeless Tobacco    Never Used                                                    History   Alcohol Use No                                                    Current Outpatient Prescriptions:     mometasone (NASONEX) 50 MCG/ACT nasal spray, 2 sprays by Nasal route 2 times daily MUST BE GENERIC SANDOZ!!, Disp: 3 Inhaler, Rfl: 3    buPROPion (WELLBUTRIN) 100 MG tablet, Take 1 tablet by mouth 2 times daily, Disp: 180 tablet, Rfl: 1    sodium chloride 3 % nebulizer solution, USE 1 VIAL VIA NEBULIZER FOUR TIMES DAILY AS NEEDED, Disp: 480 mL, Rfl: 11    fluticasone-vilanterol (BREO ELLIPTA) 100-25 MCG/INH AEPB inhaler, Inhale 1 puff into the lungs daily, Disp: 3 each, Rfl: 3    albuterol (PROVENTIL) (2.5 MG/3ML) 0.083% nebulizer solution, Use every 4 hr as needed for chest congestion, Disp: 120 each, Rfl: 3    azithromycin (ZITHROMAX) 500 MG tablet, Take 1 tablet daily for 5 days. , Disp: 5 tablet, Rfl: 0    acyclovir (ZOVIRAX) 800 MG tablet, Take 1 tablet by mouth 2 times daily, Disp: 180 tablet, Rfl: 0    prochlorperazine (COMPAZINE) 10 MG tablet, Take 1 tablet by mouth every 6 hours as needed (nausea), Disp: 40 tablet, Rfl: 1    cloNIDine (CATAPRES) 0.1 MG tablet, Take 0.5 tablets by mouth 2 times daily, Disp: 90 tablet, Rfl: 1    isosorbide mononitrate (ISMO;MONOKET) 10 MG tablet, TAKE ONE AND ONE-HALF TABLETS TWICE A DAY, Disp: 270 tablet, Rfl: 1    omega-3 acid ethyl esters (LOVAZA) 1 g capsule, Take 2 capsules by mouth 2 all other systems are reviewed and are negative    PHYSICAL EXAMINATION:   GENERAL: wdwn- no acute distress  COMMUNICATION :  Normal voice  MENTAL STATUS:  Normal  HEAD AND FACE:  Normal  EXTERNAL EARS AND NOSE:  Normal  FACIAL MUSCLES:  Normal  FACE PALPATION:  Negative  OTOSCOPY:  Right ear is normal.  Left tube is still in position. It is occluded, but the middle ear spaces well aerated  TUNING FORKS:  Rinne ++ Hernandes midline at 512 Hz  INTRANASAL:  Septum midline, turbinates normal, meati clear. LIPS, TEETH, GINGIVA:  Normal mucosa  PHARYNX:  Normal  NECK:  No masses or adenopathy  SALIVARY GLANDS:  Normal  THYROID:  Normal    IMPRESSION: Occluded left PE tube but the eustachian tube on the left seems to be functioning at this time. No evidence of bacterial upper respiratory infection. PLAN: Patient reassured about his ear nose and throat exam.    FOLLOW-UP: As needed.

## 2017-12-07 ENCOUNTER — OFFICE VISIT (OUTPATIENT)
Dept: PULMONOLOGY | Age: 62
End: 2017-12-07

## 2017-12-07 VITALS
RESPIRATION RATE: 16 BRPM | DIASTOLIC BLOOD PRESSURE: 84 MMHG | WEIGHT: 184 LBS | SYSTOLIC BLOOD PRESSURE: 134 MMHG | HEIGHT: 70 IN | OXYGEN SATURATION: 98 % | BODY MASS INDEX: 26.34 KG/M2 | HEART RATE: 116 BPM

## 2017-12-07 DIAGNOSIS — J21.9 BRONCHIOLITIS: Primary | ICD-10-CM

## 2017-12-07 DIAGNOSIS — C90.01 MULTIPLE MYELOMA IN REMISSION (HCC): ICD-10-CM

## 2017-12-07 DIAGNOSIS — D80.1 HYPOGAMMAGLOBULINEMIA (HCC): ICD-10-CM

## 2017-12-07 DIAGNOSIS — G47.33 OSA (OBSTRUCTIVE SLEEP APNEA): Chronic | ICD-10-CM

## 2017-12-07 PROCEDURE — 99214 OFFICE O/P EST MOD 30 MIN: CPT | Performed by: INTERNAL MEDICINE

## 2017-12-07 RX ORDER — AZITHROMYCIN 500 MG/1
TABLET, FILM COATED ORAL
Qty: 3 TABLET | Refills: 2 | Status: ON HOLD | OUTPATIENT
Start: 2017-12-07 | End: 2018-01-01 | Stop reason: HOSPADM

## 2017-12-07 NOTE — PROGRESS NOTES
Bronchiolitis has been relatively stable. It isn't clear that his recent complaints of fatigue have been necessarily related to this, although he believes that's the case. It is also uncertain whether a mild drop in IgG level is actually affecting airways inflammation and function. We will await further observation post IgG infusion. Obstructive sleep apnea, compensated with use of nasal CPAP  Multiple myeloma, in remission, on therapy      Plan:      Continue ICS/LABA, topical nasal steroid.   Azithromycin prescribed to have on hand for occasional developing a lower respiratory infection  Continue nocturnal CPAP

## 2017-12-14 ENCOUNTER — OFFICE VISIT (OUTPATIENT)
Dept: DERMATOLOGY | Age: 62
End: 2017-12-14

## 2017-12-14 DIAGNOSIS — Z78.9 NON-TOBACCO USER: ICD-10-CM

## 2017-12-14 DIAGNOSIS — C90.00 MULTIPLE MYELOMA, REMISSION STATUS UNSPECIFIED (HCC): ICD-10-CM

## 2017-12-14 DIAGNOSIS — L57.8 PHOTOAGING OF SKIN: ICD-10-CM

## 2017-12-14 DIAGNOSIS — L57.0 ACTINIC KERATOSES: Primary | ICD-10-CM

## 2017-12-14 PROCEDURE — 17000 DESTRUCT PREMALG LESION: CPT | Performed by: DERMATOLOGY

## 2017-12-14 PROCEDURE — 99242 OFF/OP CONSLTJ NEW/EST SF 20: CPT | Performed by: DERMATOLOGY

## 2017-12-14 PROCEDURE — 17003 DESTRUCT PREMALG LES 2-14: CPT | Performed by: DERMATOLOGY

## 2017-12-14 NOTE — Clinical Note
Dear Dr. Lisa Alvarez,  I had the pleasure of seeing your patient, Chasity Miller, in my office recently. Thanks so much for involving me in his care. Please see my note and call me if you have any questions.   Best regards, Isma Marin, DO

## 2017-12-14 NOTE — PROGRESS NOTES
[facility]) Dermatology  hospitalsDO jaison, [address]       Brenda Marie  1955    58 y.o. male     Date of Visit: 2017    Chief Complaint:   Chief Complaint   Patient presents with    Lesion(s)     multiple on forehead, being treated for multiple myeloma, doing well        I was asked to see this patient by Dr. Yaakov Sanchez. Spots to his scalp. History of Present Illness:  Brenda Marie is a 58 y.o. male who presents with the chief complaint of Establish care and for the followin. Complains of a few rough scaly spots to his scalp, present for several months, thinks one may have thickened over time. Denies itching, burning, bleeding, pain. Has multiple brown asymptomatic spots to scalp and face that have gradually appeared over time. Declines TBSE today. Admits to sun exposure in youth without wearing sunscreen, hats, or protective clothing. With his history of multiple myeloma he recently has not spent much time outdoors. Review of Systems:  Constitutional: Reports general sense of well-being   Skin: No new or changing moles, no history of keloids or hypertrophic scars. Heme: No abnormal bruising or bleeding. Past Medical History, Family History, Surgical History, Medications and Allergies reviewed. Past Skin Hx:   Patient denies past history of melanoma, NMSC, dysplastic nevi, or chronic skin rashes.     PFHx: Denies hx of MM or NMSC    PMHx: multiple myeloma-followed regularly by Dr. Lisa Vera, and Cricket and receiving IVIG as well as an experimental immunosuppressant medication    Family History   Problem Relation Age of Onset    Breast Cancer Mother     Coronary Art Dis Father     Hearing Loss Father     Hypertension Father     High Cholesterol Father      Past Medical History:   Diagnosis Date    Acute renal disease     Arthritis     Cough     CRI (chronic renal insufficiency) 2015    Fibromyalgia     GERD (gastroesophageal reflux disease) congestion 120 each 3    azithromycin (ZITHROMAX) 500 MG tablet Take 1 tablet daily for 5 days. 5 tablet 0    acyclovir (ZOVIRAX) 800 MG tablet Take 1 tablet by mouth 2 times daily 180 tablet 0    prochlorperazine (COMPAZINE) 10 MG tablet Take 1 tablet by mouth every 6 hours as needed (nausea) 40 tablet 1    cloNIDine (CATAPRES) 0.1 MG tablet Take 0.5 tablets by mouth 2 times daily 90 tablet 1    isosorbide mononitrate (ISMO;MONOKET) 10 MG tablet TAKE ONE AND ONE-HALF TABLETS TWICE A  tablet 1    omega-3 acid ethyl esters (LOVAZA) 1 g capsule Take 2 capsules by mouth 2 times daily 360 capsule 1    clonazePAM (KLONOPIN) 0.5 MG tablet Take 2-4 tablets by mouth 3 times daily as needed (anxiety / sleep) 270 tablet 1    doxazosin (CARDURA) 1 MG tablet Take 1 tablet by mouth daily 30 tablet 3    epinastine (ELESTAT) 0.05 % SOLN       Pamidronate Disodium (AREDIA IV) Infuse intravenously      Calcium Carb-Cholecalciferol (CALCIUM + D3) 600-200 MG-UNIT TABS tablet Take 1 tablet by mouth Daily with lunch      folic acid (FOLVITE) 924 MCG tablet Take 400 mcg by mouth daily      Cyanocobalamin (B-12) 2500 MCG TABS Take by mouth      guaiFENesin 400 MG tablet Take 400 mg by mouth 4 times daily as needed for Cough      Doxepin HCl 3 MG TABS Take by mouth      oxyCODONE (ROXICODONE) 5 MG immediate release tablet Take one-half a tablet every 4-6 hours as needed for pain.  75 tablet 0    predniSONE (DELTASONE) 10 MG tablet 4 tabs daily x 1 week, 3 tabs x 1 week, 2 tabs daily 100 tablet 3    albuterol sulfate HFA (PROVENTIL HFA) 108 (90 BASE) MCG/ACT inhaler Inhale 2 puffs into the lungs every 4 hours as needed for Wheezing 1 Inhaler 5    Spacer/Aero-Holding Chambers (AEROCHAMBER PLUS-FLOW SIGNAL) MISC Use with inhaler as directed 1 each 3    triamcinolone (NASACORT) 55 MCG/ACT nasal inhaler 2 sprays by Nasal route daily 1 Inhaler 3    dexamethasone (DECADRON) 4 MG tablet Take as directed per research 20 Scalp/hair.     -General: NAD, well-nourished, well-developed. Areas of skin examined as listed above:   1. ill defined irreg shaped gritty keratotic pink papules to frontal and vertex scalp  2. Face, neck-cattered dyspigmentation with multiple lentigines and vascular change consistent with chronic sun exposure      Assessment and Plan     1. Actinic keratoses    2. Photoaging of skin    3. Multiple myeloma, remission status unspecified ([facility]    4. Non-tobacco user        1. Actinic keratoses  Actinic keratosis(es)  -Edu re: relationship with chronic cumulative sun exposure, low premalignant potential.   -3 lesion(s) treated w/ liquid nitrogen x 1 cycles, 3 seconds each. Edu re: risk of blister formation, discomfort, scar, hypopigmentation. Discussed wound care. -Reviewed sun protective behavior -- sun avoidance during the peak hours of the day, sun-protective clothing (including hat and sunglasses), sunscreen use (water resistant, broad spectrum, SPF at least 30, need for reapplication every 2 to 3 hours). -Patient to contact office if AK fails to resolve despite treatment, or if patient develops side effect from therapy, such as unbearable crusting, scabbing, redness, or tenderness.      - SD DESTRUC PREMALIGNANT, FIRST LESION  - SD DESTRUC PREMALIGNANT,2-14 LESIONS    2. Photoaging of skin  -Patient's skin showing evidence of chronic sun exposure leading to diffuse photodamage and photo-aging  -Educated patient that chronic sun exposure leads to increased risk for skin cancers and to have at least annual skin exams (earlier if indicated) in the office.   -The patient was counseled regarding sun protective practices such as sunscreen use (water resistant, broad spectrum sunscreen with SPF rating of at least 30) and need for reapplication at least every 2 hours, sun protective clothing (including hat and sunglasses), avoidance of sun during the peak hours of the day, and avoidance of tanning beds.     3.

## 2018-01-01 ENCOUNTER — HOSPITAL ENCOUNTER (OUTPATIENT)
Dept: ONCOLOGY | Age: 63
Setting detail: INFUSION SERIES
Discharge: HOME | End: 2018-10-05
Payer: COMMERCIAL

## 2018-01-01 ENCOUNTER — HOSPITAL ENCOUNTER (INPATIENT)
Age: 63
LOS: 3 days | Discharge: HOME | DRG: 193 | End: 2018-10-29
Attending: INTERNAL MEDICINE | Admitting: INTERNAL MEDICINE
Payer: COMMERCIAL

## 2018-01-01 ENCOUNTER — HOSPITAL ENCOUNTER (OUTPATIENT)
Dept: ONCOLOGY | Age: 63
Setting detail: INFUSION SERIES
Discharge: HOME | End: 2018-10-18
Payer: COMMERCIAL

## 2018-01-01 ENCOUNTER — HOSPITAL ENCOUNTER (OUTPATIENT)
Dept: ONCOLOGY | Age: 63
Setting detail: INFUSION SERIES
Discharge: HOME | End: 2018-12-27
Payer: COMMERCIAL

## 2018-01-01 ENCOUNTER — HOSPITAL ENCOUNTER (OUTPATIENT)
Dept: CT IMAGING | Age: 63
Discharge: HOME | End: 2018-09-18
Payer: COMMERCIAL

## 2018-01-01 ENCOUNTER — APPOINTMENT (OUTPATIENT)
Dept: GENERAL RADIOLOGY | Age: 63
DRG: 193 | End: 2018-01-01
Attending: INTERNAL MEDICINE
Payer: COMMERCIAL

## 2018-01-01 ENCOUNTER — HOSPITAL ENCOUNTER (OUTPATIENT)
Dept: CT IMAGING | Age: 63
Discharge: HOME | End: 2018-11-12
Payer: COMMERCIAL

## 2018-01-01 ENCOUNTER — TELEPHONE (OUTPATIENT)
Dept: PULMONOLOGY | Age: 63
End: 2018-01-01

## 2018-01-01 ENCOUNTER — HOSPITAL ENCOUNTER (OUTPATIENT)
Age: 63
Discharge: HOME | End: 2018-11-26
Payer: COMMERCIAL

## 2018-01-01 ENCOUNTER — OFFICE VISIT (OUTPATIENT)
Dept: ENT CLINIC | Age: 63
End: 2018-01-01

## 2018-01-01 ENCOUNTER — HOSPITAL ENCOUNTER (OUTPATIENT)
Dept: MRI IMAGING | Age: 63
Discharge: HOME | End: 2018-08-24
Payer: COMMERCIAL

## 2018-01-01 ENCOUNTER — HOSPITAL ENCOUNTER (OUTPATIENT)
Dept: GENERAL RADIOLOGY | Age: 63
Discharge: HOME | End: 2018-11-26
Payer: COMMERCIAL

## 2018-01-01 ENCOUNTER — HOSPITAL ENCOUNTER (OUTPATIENT)
Dept: ONCOLOGY | Age: 63
Setting detail: INFUSION SERIES
Discharge: HOME | End: 2018-12-10
Payer: COMMERCIAL

## 2018-01-01 ENCOUNTER — HOSPITAL ENCOUNTER (OUTPATIENT)
Dept: CT IMAGING | Age: 63
Discharge: HOME | End: 2018-11-27
Payer: COMMERCIAL

## 2018-01-01 ENCOUNTER — HOSPITAL ENCOUNTER (OUTPATIENT)
Dept: GENERAL RADIOLOGY | Age: 63
Discharge: HOME | End: 2018-09-17
Payer: COMMERCIAL

## 2018-01-01 ENCOUNTER — OFFICE VISIT (OUTPATIENT)
Dept: PULMONOLOGY | Age: 63
End: 2018-01-01
Payer: COMMERCIAL

## 2018-01-01 ENCOUNTER — TELEPHONE (OUTPATIENT)
Dept: FAMILY MEDICINE CLINIC | Age: 63
End: 2018-01-01

## 2018-01-01 ENCOUNTER — HOSPITAL ENCOUNTER (OUTPATIENT)
Dept: ONCOLOGY | Age: 63
Setting detail: INFUSION SERIES
Discharge: HOME | End: 2018-10-22
Payer: COMMERCIAL

## 2018-01-01 ENCOUNTER — HOSPITAL ENCOUNTER (OUTPATIENT)
Dept: CT IMAGING | Age: 63
Discharge: HOME | End: 2018-12-26
Payer: COMMERCIAL

## 2018-01-01 ENCOUNTER — HOSPITAL ENCOUNTER (OUTPATIENT)
Dept: ONCOLOGY | Age: 63
Setting detail: INFUSION SERIES
Discharge: HOME | End: 2018-09-25
Payer: COMMERCIAL

## 2018-01-01 ENCOUNTER — HOSPITAL ENCOUNTER (OUTPATIENT)
Dept: ONCOLOGY | Age: 63
Setting detail: INFUSION SERIES
Discharge: HOME | End: 2018-10-03
Payer: COMMERCIAL

## 2018-01-01 ENCOUNTER — HOSPITAL ENCOUNTER (OUTPATIENT)
Age: 63
Discharge: HOME | End: 2018-09-17
Payer: COMMERCIAL

## 2018-01-01 ENCOUNTER — HOSPITAL ENCOUNTER (OUTPATIENT)
Dept: ONCOLOGY | Age: 63
Setting detail: INFUSION SERIES
Discharge: HOME | End: 2018-10-11
Payer: COMMERCIAL

## 2018-01-01 VITALS
TEMPERATURE: 98.2 F | DIASTOLIC BLOOD PRESSURE: 78 MMHG | SYSTOLIC BLOOD PRESSURE: 127 MMHG | RESPIRATION RATE: 18 BRPM | HEART RATE: 107 BPM

## 2018-01-01 VITALS
RESPIRATION RATE: 18 BRPM | HEIGHT: 70 IN | HEART RATE: 87 BPM | SYSTOLIC BLOOD PRESSURE: 104 MMHG | BODY MASS INDEX: 23.77 KG/M2 | WEIGHT: 166 LBS | DIASTOLIC BLOOD PRESSURE: 69 MMHG | OXYGEN SATURATION: 100 % | TEMPERATURE: 98.4 F

## 2018-01-01 VITALS
DIASTOLIC BLOOD PRESSURE: 73 MMHG | SYSTOLIC BLOOD PRESSURE: 122 MMHG | HEART RATE: 105 BPM | TEMPERATURE: 98.3 F | RESPIRATION RATE: 16 BRPM

## 2018-01-01 VITALS
BODY MASS INDEX: 27.04 KG/M2 | OXYGEN SATURATION: 98 % | DIASTOLIC BLOOD PRESSURE: 60 MMHG | SYSTOLIC BLOOD PRESSURE: 100 MMHG | HEART RATE: 108 BPM | RESPIRATION RATE: 16 BRPM | WEIGHT: 178.4 LBS | HEIGHT: 68 IN

## 2018-01-01 VITALS
WEIGHT: 157.4 LBS | BODY MASS INDEX: 22.54 KG/M2 | HEART RATE: 108 BPM | HEIGHT: 70 IN | DIASTOLIC BLOOD PRESSURE: 79 MMHG | SYSTOLIC BLOOD PRESSURE: 144 MMHG

## 2018-01-01 VITALS
HEART RATE: 115 BPM | HEIGHT: 68 IN | BODY MASS INDEX: 26.73 KG/M2 | WEIGHT: 176.4 LBS | DIASTOLIC BLOOD PRESSURE: 54 MMHG | OXYGEN SATURATION: 98 % | SYSTOLIC BLOOD PRESSURE: 100 MMHG

## 2018-01-01 VITALS
DIASTOLIC BLOOD PRESSURE: 60 MMHG | WEIGHT: 175 LBS | OXYGEN SATURATION: 98 % | SYSTOLIC BLOOD PRESSURE: 130 MMHG | HEART RATE: 116 BPM | RESPIRATION RATE: 16 BRPM | BODY MASS INDEX: 26.52 KG/M2 | HEIGHT: 68 IN

## 2018-01-01 VITALS
TEMPERATURE: 97.7 F | RESPIRATION RATE: 18 BRPM | DIASTOLIC BLOOD PRESSURE: 96 MMHG | SYSTOLIC BLOOD PRESSURE: 139 MMHG | HEART RATE: 106 BPM

## 2018-01-01 VITALS
RESPIRATION RATE: 16 BRPM | HEART RATE: 96 BPM | SYSTOLIC BLOOD PRESSURE: 139 MMHG | DIASTOLIC BLOOD PRESSURE: 91 MMHG | TEMPERATURE: 97.4 F

## 2018-01-01 VITALS
SYSTOLIC BLOOD PRESSURE: 149 MMHG | HEART RATE: 101 BPM | TEMPERATURE: 98.1 F | DIASTOLIC BLOOD PRESSURE: 71 MMHG | RESPIRATION RATE: 18 BRPM

## 2018-01-01 VITALS
HEART RATE: 103 BPM | SYSTOLIC BLOOD PRESSURE: 161 MMHG | TEMPERATURE: 97.8 F | RESPIRATION RATE: 18 BRPM | DIASTOLIC BLOOD PRESSURE: 100 MMHG

## 2018-01-01 VITALS
TEMPERATURE: 98.5 F | SYSTOLIC BLOOD PRESSURE: 157 MMHG | RESPIRATION RATE: 18 BRPM | HEART RATE: 104 BPM | DIASTOLIC BLOOD PRESSURE: 88 MMHG

## 2018-01-01 VITALS
RESPIRATION RATE: 18 BRPM | TEMPERATURE: 98 F | DIASTOLIC BLOOD PRESSURE: 79 MMHG | SYSTOLIC BLOOD PRESSURE: 126 MMHG | HEART RATE: 103 BPM

## 2018-01-01 DIAGNOSIS — C90.00 MULTIPLE MYELOMA, REMISSION STATUS UNSPECIFIED (HCC): ICD-10-CM

## 2018-01-01 DIAGNOSIS — C90.02 MULTIPLE MYELOMA IN RELAPSE (HCC): ICD-10-CM

## 2018-01-01 DIAGNOSIS — F32.A ANXIETY AND DEPRESSION: Chronic | ICD-10-CM

## 2018-01-01 DIAGNOSIS — C90.00 MYELOMA ASSOCIATED AMYLOIDOSIS (HCC): ICD-10-CM

## 2018-01-01 DIAGNOSIS — H81.11 BENIGN PAROXYSMAL POSITIONAL VERTIGO OF RIGHT EAR: ICD-10-CM

## 2018-01-01 DIAGNOSIS — F41.9 ANXIETY AND DEPRESSION: Chronic | ICD-10-CM

## 2018-01-01 DIAGNOSIS — I10 ESSENTIAL HYPERTENSION: ICD-10-CM

## 2018-01-01 DIAGNOSIS — J21.9 BRONCHIOLITIS: ICD-10-CM

## 2018-01-01 DIAGNOSIS — C90.01 MULTIPLE MYELOMA IN REMISSION (HCC): ICD-10-CM

## 2018-01-01 DIAGNOSIS — J21.9 BRONCHIOLITIS: Primary | ICD-10-CM

## 2018-01-01 DIAGNOSIS — E85.9 MYELOMA ASSOCIATED AMYLOIDOSIS (HCC): ICD-10-CM

## 2018-01-01 DIAGNOSIS — G62.0 DRUG-INDUCED POLYNEUROPATHY (HCC): ICD-10-CM

## 2018-01-01 DIAGNOSIS — R49.9 CHANGE IN VOICE: ICD-10-CM

## 2018-01-01 DIAGNOSIS — Z51.11 ENCOUNTER FOR CHEMOTHERAPY MANAGEMENT: ICD-10-CM

## 2018-01-01 DIAGNOSIS — J32.1 SINUSITIS CHRONIC, FRONTAL: Primary | ICD-10-CM

## 2018-01-01 DIAGNOSIS — R06.00 DYSPNEA AND RESPIRATORY ABNORMALITY: ICD-10-CM

## 2018-01-01 DIAGNOSIS — R06.02 BREATH SHORTNESS: ICD-10-CM

## 2018-01-01 DIAGNOSIS — G47.00 INSOMNIA, UNSPECIFIED TYPE: Chronic | ICD-10-CM

## 2018-01-01 DIAGNOSIS — H81.319 AUDITORY VERTIGO, UNSPECIFIED LATERALITY: Primary | ICD-10-CM

## 2018-01-01 DIAGNOSIS — J18.9 PNEUMONIA OF RIGHT UPPER LOBE DUE TO INFECTIOUS ORGANISM: ICD-10-CM

## 2018-01-01 DIAGNOSIS — J70.0 RADIATION PNEUMONITIS (HCC): Primary | ICD-10-CM

## 2018-01-01 DIAGNOSIS — G62.9 NEUROPATHY: Primary | ICD-10-CM

## 2018-01-01 DIAGNOSIS — Z94.81 STATUS POST AUTOLOGOUS BONE MARROW TRANSPLANTATION (HCC): ICD-10-CM

## 2018-01-01 DIAGNOSIS — J70.0 RADIATION PNEUMONITIS (HCC): ICD-10-CM

## 2018-01-01 DIAGNOSIS — R06.89 DYSPNEA AND RESPIRATORY ABNORMALITY: ICD-10-CM

## 2018-01-01 DIAGNOSIS — R05.3 CHRONIC COUGH: ICD-10-CM

## 2018-01-01 LAB
ABO/RH: NORMAL
ALBUMIN SERPL-MCNC: 3 G/DL (ref 3.4–5)
ALBUMIN SERPL-MCNC: 3.8 G/DL (ref 3.4–5)
ALP BLD-CCNC: 70 U/L (ref 40–129)
ALP BLD-CCNC: 85 U/L (ref 40–129)
ALT SERPL-CCNC: 18 U/L (ref 10–40)
ALT SERPL-CCNC: 21 U/L (ref 10–40)
ANION GAP SERPL CALCULATED.3IONS-SCNC: 11 MMOL/L (ref 3–16)
ANION GAP SERPL CALCULATED.3IONS-SCNC: 11 MMOL/L (ref 3–16)
ANION GAP SERPL CALCULATED.3IONS-SCNC: 12 MMOL/L (ref 3–16)
ANION GAP SERPL CALCULATED.3IONS-SCNC: 14 MMOL/L (ref 3–16)
ANISOCYTOSIS: ABNORMAL
ANTIBODY SCREEN: NORMAL
ANTIBODY SCREEN: NORMAL
APTT: 39 SEC (ref 26–36)
APTT: 39.7 SEC (ref 26–36)
AST SERPL-CCNC: 17 U/L (ref 15–37)
AST SERPL-CCNC: 22 U/L (ref 15–37)
BACTERIA: ABNORMAL /HPF
BASOPHILS ABSOLUTE: 0 K/UL (ref 0–0.2)
BASOPHILS RELATIVE PERCENT: 0 %
BASOPHILS RELATIVE PERCENT: 0 %
BASOPHILS RELATIVE PERCENT: 0.3 %
BASOPHILS RELATIVE PERCENT: 0.7 %
BILIRUB SERPL-MCNC: 0.5 MG/DL (ref 0–1)
BILIRUB SERPL-MCNC: 0.6 MG/DL (ref 0–1)
BILIRUBIN DIRECT: <0.2 MG/DL (ref 0–0.3)
BILIRUBIN DIRECT: <0.2 MG/DL (ref 0–0.3)
BILIRUBIN URINE: NEGATIVE
BILIRUBIN, INDIRECT: ABNORMAL MG/DL (ref 0–1)
BILIRUBIN, INDIRECT: ABNORMAL MG/DL (ref 0–1)
BLOOD BANK DISPENSE STATUS: NORMAL
BLOOD BANK PRODUCT CODE: NORMAL
BLOOD CULTURE, ROUTINE: NORMAL
BLOOD, URINE: ABNORMAL
BPU ID: NORMAL
BUN BLDV-MCNC: 16 MG/DL (ref 7–20)
BUN BLDV-MCNC: 17 MG/DL (ref 7–20)
BUN BLDV-MCNC: 17 MG/DL (ref 7–20)
BUN BLDV-MCNC: 18 MG/DL (ref 7–20)
CALCIUM SERPL-MCNC: 8.5 MG/DL (ref 8.3–10.6)
CALCIUM SERPL-MCNC: 8.8 MG/DL (ref 8.3–10.6)
CALCIUM SERPL-MCNC: 9.1 MG/DL (ref 8.3–10.6)
CALCIUM SERPL-MCNC: 9.1 MG/DL (ref 8.3–10.6)
CHLORIDE BLD-SCNC: 108 MMOL/L (ref 99–110)
CHLORIDE BLD-SCNC: 110 MMOL/L (ref 99–110)
CHLORIDE BLD-SCNC: 110 MMOL/L (ref 99–110)
CHLORIDE BLD-SCNC: 111 MMOL/L (ref 99–110)
CLARITY: CLEAR
CO2: 21 MMOL/L (ref 21–32)
COLOR: YELLOW
CREAT SERPL-MCNC: 1.4 MG/DL (ref 0.8–1.3)
CREAT SERPL-MCNC: 1.5 MG/DL (ref 0.8–1.3)
CREAT SERPL-MCNC: 1.6 MG/DL (ref 0.8–1.3)
CREAT SERPL-MCNC: 1.7 MG/DL (ref 0.8–1.3)
DESCRIPTION BLOOD BANK: NORMAL
EOSINOPHILS ABSOLUTE: 0 K/UL (ref 0–0.6)
EOSINOPHILS RELATIVE PERCENT: 0 %
EOSINOPHILS RELATIVE PERCENT: 0.1 %
EPITHELIAL CELLS, UA: ABNORMAL /HPF
GFR AFRICAN AMERICAN: 49
GFR AFRICAN AMERICAN: 53
GFR AFRICAN AMERICAN: 57
GFR AFRICAN AMERICAN: >60
GFR NON-AFRICAN AMERICAN: 41
GFR NON-AFRICAN AMERICAN: 44
GFR NON-AFRICAN AMERICAN: 47
GFR NON-AFRICAN AMERICAN: 51
GLUCOSE BLD-MCNC: 106 MG/DL (ref 70–99)
GLUCOSE BLD-MCNC: 108 MG/DL (ref 70–99)
GLUCOSE BLD-MCNC: 115 MG/DL (ref 70–99)
GLUCOSE BLD-MCNC: 92 MG/DL (ref 70–99)
GLUCOSE URINE: 250 MG/DL
HCT VFR BLD CALC: 18.8 % (ref 40.5–52.5)
HCT VFR BLD CALC: 19.8 % (ref 40.5–52.5)
HCT VFR BLD CALC: 20.6 % (ref 40.5–52.5)
HCT VFR BLD CALC: 25.6 % (ref 40.5–52.5)
HEMOGLOBIN: 6.5 G/DL (ref 13.5–17.5)
HEMOGLOBIN: 6.7 G/DL (ref 13.5–17.5)
HEMOGLOBIN: 7.1 G/DL (ref 13.5–17.5)
HEMOGLOBIN: 8.8 G/DL (ref 13.5–17.5)
INR BLD: 1.14 (ref 0.86–1.14)
INR BLD: 1.22 (ref 0.86–1.14)
KETONES, URINE: NEGATIVE MG/DL
LACTATE DEHYDROGENASE: 315 U/L (ref 100–190)
LACTATE DEHYDROGENASE: 339 U/L (ref 100–190)
LEUKOCYTE ESTERASE, URINE: NEGATIVE
LYMPHOCYTES ABSOLUTE: 0.2 K/UL (ref 1–5.1)
LYMPHOCYTES RELATIVE PERCENT: 13.8 %
LYMPHOCYTES RELATIVE PERCENT: 15 %
LYMPHOCYTES RELATIVE PERCENT: 15 %
LYMPHOCYTES RELATIVE PERCENT: 8 %
MACROCYTES: ABNORMAL
MAGNESIUM: 2 MG/DL (ref 1.8–2.4)
MAGNESIUM: 2.2 MG/DL (ref 1.8–2.4)
MCH RBC QN AUTO: 32.1 PG (ref 26–34)
MCH RBC QN AUTO: 32.7 PG (ref 26–34)
MCH RBC QN AUTO: 32.8 PG (ref 26–34)
MCH RBC QN AUTO: 33.1 PG (ref 26–34)
MCHC RBC AUTO-ENTMCNC: 33.9 G/DL (ref 31–36)
MCHC RBC AUTO-ENTMCNC: 34.3 G/DL (ref 31–36)
MCHC RBC AUTO-ENTMCNC: 34.5 G/DL (ref 31–36)
MCHC RBC AUTO-ENTMCNC: 34.7 G/DL (ref 31–36)
MCV RBC AUTO: 93.4 FL (ref 80–100)
MCV RBC AUTO: 95.2 FL (ref 80–100)
MCV RBC AUTO: 95.5 FL (ref 80–100)
MCV RBC AUTO: 96.6 FL (ref 80–100)
METAMYELOCYTES RELATIVE PERCENT: 2 %
MICROCYTES: ABNORMAL
MICROSCOPIC EXAMINATION: YES
MONOCYTES ABSOLUTE: 0.2 K/UL (ref 0–1.3)
MONOCYTES ABSOLUTE: 0.3 K/UL (ref 0–1.3)
MONOCYTES RELATIVE PERCENT: 13 %
MONOCYTES RELATIVE PERCENT: 13.1 %
MONOCYTES RELATIVE PERCENT: 15.5 %
MONOCYTES RELATIVE PERCENT: 17 %
NEUTROPHILS ABSOLUTE: 0.9 K/UL (ref 1.7–7.7)
NEUTROPHILS ABSOLUTE: 1.1 K/UL (ref 1.7–7.7)
NEUTROPHILS ABSOLUTE: 1.2 K/UL (ref 1.7–7.7)
NEUTROPHILS ABSOLUTE: 1.8 K/UL (ref 1.7–7.7)
NEUTROPHILS RELATIVE PERCENT: 66 %
NEUTROPHILS RELATIVE PERCENT: 70.3 %
NEUTROPHILS RELATIVE PERCENT: 72 %
NEUTROPHILS RELATIVE PERCENT: 78.2 %
NITRITE, URINE: NEGATIVE
PDW BLD-RTO: 17.4 % (ref 12.4–15.4)
PDW BLD-RTO: 18.2 % (ref 12.4–15.4)
PDW BLD-RTO: 18.7 % (ref 12.4–15.4)
PDW BLD-RTO: 18.8 % (ref 12.4–15.4)
PH UA: 6.5
PHOSPHORUS: 2.9 MG/DL (ref 2.5–4.9)
PHOSPHORUS: 3.6 MG/DL (ref 2.5–4.9)
PLATELET # BLD: 24 K/UL (ref 135–450)
PLATELET # BLD: 28 K/UL (ref 135–450)
PLATELET # BLD: 30 K/UL (ref 135–450)
PLATELET # BLD: 38 K/UL (ref 135–450)
PLATELET SLIDE REVIEW: ABNORMAL
PMV BLD AUTO: 10.1 FL (ref 5–10.5)
PMV BLD AUTO: 7.7 FL (ref 5–10.5)
PMV BLD AUTO: 7.9 FL (ref 5–10.5)
PMV BLD AUTO: 8.7 FL (ref 5–10.5)
POLYCHROMASIA: ABNORMAL
POTASSIUM SERPL-SCNC: 3.7 MMOL/L (ref 3.5–5.1)
POTASSIUM SERPL-SCNC: 3.8 MMOL/L (ref 3.5–5.1)
POTASSIUM SERPL-SCNC: 3.8 MMOL/L (ref 3.5–5.1)
POTASSIUM SERPL-SCNC: 3.9 MMOL/L (ref 3.5–5.1)
PROTEIN UA: NEGATIVE MG/DL
PROTHROMBIN TIME: 13 SEC (ref 9.8–13)
PROTHROMBIN TIME: 13.9 SEC (ref 9.8–13)
RBC # BLD: 1.98 M/UL (ref 4.2–5.9)
RBC # BLD: 2.05 M/UL (ref 4.2–5.9)
RBC # BLD: 2.16 M/UL (ref 4.2–5.9)
RBC # BLD: 2.74 M/UL (ref 4.2–5.9)
RBC UA: ABNORMAL /HPF (ref 0–2)
SODIUM BLD-SCNC: 142 MMOL/L (ref 136–145)
SODIUM BLD-SCNC: 142 MMOL/L (ref 136–145)
SODIUM BLD-SCNC: 143 MMOL/L (ref 136–145)
SODIUM BLD-SCNC: 144 MMOL/L (ref 136–145)
SPECIFIC GRAVITY UA: 1.01
TEAR DROP CELLS: ABNORMAL
TEAR DROP CELLS: ABNORMAL
TOTAL PROTEIN: 5.6 G/DL (ref 6.4–8.2)
TOTAL PROTEIN: 5.9 G/DL (ref 6.4–8.2)
URIC ACID, SERUM: 2.6 MG/DL (ref 3.5–7.2)
URIC ACID, SERUM: 3.2 MG/DL (ref 3.5–7.2)
URINE TYPE: ABNORMAL
UROBILINOGEN, URINE: 0.2 E.U./DL
VRE CULTURE: NORMAL
WBC # BLD: 1.3 K/UL (ref 4–11)
WBC # BLD: 1.6 K/UL (ref 4–11)
WBC # BLD: 1.7 K/UL (ref 4–11)
WBC # BLD: 2.3 K/UL (ref 4–11)
WBC UA: ABNORMAL /HPF (ref 0–5)

## 2018-01-01 PROCEDURE — 6360000002 HC RX W HCPCS: Performed by: PHYSICIAN ASSISTANT

## 2018-01-01 PROCEDURE — 99214 OFFICE O/P EST MOD 30 MIN: CPT | Performed by: INTERNAL MEDICINE

## 2018-01-01 PROCEDURE — 80076 HEPATIC FUNCTION PANEL: CPT

## 2018-01-01 PROCEDURE — 36430 TRANSFUSION BLD/BLD COMPNT: CPT | Performed by: NURSE PRACTITIONER

## 2018-01-01 PROCEDURE — 6370000000 HC RX 637 (ALT 250 FOR IP): Performed by: INTERNAL MEDICINE

## 2018-01-01 PROCEDURE — 80048 BASIC METABOLIC PNL TOTAL CA: CPT

## 2018-01-01 PROCEDURE — 2580000003 HC RX 258: Performed by: PHYSICIAN ASSISTANT

## 2018-01-01 PROCEDURE — 36430 TRANSFUSION BLD/BLD COMPNT: CPT

## 2018-01-01 PROCEDURE — 86850 RBC ANTIBODY SCREEN: CPT

## 2018-01-01 PROCEDURE — 70486 CT MAXILLOFACIAL W/O DYE: CPT

## 2018-01-01 PROCEDURE — 6370000000 HC RX 637 (ALT 250 FOR IP): Performed by: PHYSICIAN ASSISTANT

## 2018-01-01 PROCEDURE — 99213 OFFICE O/P EST LOW 20 MIN: CPT | Performed by: OTOLARYNGOLOGY

## 2018-01-01 PROCEDURE — 84100 ASSAY OF PHOSPHORUS: CPT

## 2018-01-01 PROCEDURE — 84550 ASSAY OF BLOOD/URIC ACID: CPT

## 2018-01-01 PROCEDURE — P9037 PLATE PHERES LEUKOREDU IRRAD: HCPCS

## 2018-01-01 PROCEDURE — 71046 X-RAY EXAM CHEST 2 VIEWS: CPT

## 2018-01-01 PROCEDURE — P9040 RBC LEUKOREDUCED IRRADIATED: HCPCS

## 2018-01-01 PROCEDURE — 85025 COMPLETE CBC W/AUTO DIFF WBC: CPT

## 2018-01-01 PROCEDURE — 72192 CT PELVIS W/O DYE: CPT

## 2018-01-01 PROCEDURE — 2580000003 HC RX 258: Performed by: INTERNAL MEDICINE

## 2018-01-01 PROCEDURE — 2060000000 HC ICU INTERMEDIATE R&B

## 2018-01-01 PROCEDURE — 6360000002 HC RX W HCPCS: Performed by: INTERNAL MEDICINE

## 2018-01-01 PROCEDURE — 73221 MRI JOINT UPR EXTREM W/O DYE: CPT

## 2018-01-01 PROCEDURE — 86900 BLOOD TYPING SEROLOGIC ABO: CPT

## 2018-01-01 PROCEDURE — 81001 URINALYSIS AUTO W/SCOPE: CPT

## 2018-01-01 PROCEDURE — 71045 X-RAY EXAM CHEST 1 VIEW: CPT

## 2018-01-01 PROCEDURE — 86923 COMPATIBILITY TEST ELECTRIC: CPT

## 2018-01-01 PROCEDURE — 94664 DEMO&/EVAL PT USE INHALER: CPT

## 2018-01-01 PROCEDURE — 83735 ASSAY OF MAGNESIUM: CPT

## 2018-01-01 PROCEDURE — 83615 LACTATE (LD) (LDH) ENZYME: CPT

## 2018-01-01 PROCEDURE — 90471 IMMUNIZATION ADMIN: CPT | Performed by: INTERNAL MEDICINE

## 2018-01-01 PROCEDURE — 71250 CT THORAX DX C-: CPT

## 2018-01-01 PROCEDURE — 86901 BLOOD TYPING SEROLOGIC RH(D): CPT

## 2018-01-01 PROCEDURE — 72141 MRI NECK SPINE W/O DYE: CPT

## 2018-01-01 PROCEDURE — 94761 N-INVAS EAR/PLS OXIMETRY MLT: CPT

## 2018-01-01 PROCEDURE — 85730 THROMBOPLASTIN TIME PARTIAL: CPT

## 2018-01-01 PROCEDURE — 72146 MRI CHEST SPINE W/O DYE: CPT

## 2018-01-01 PROCEDURE — 87040 BLOOD CULTURE FOR BACTERIA: CPT

## 2018-01-01 PROCEDURE — 90682 RIV4 VACC RECOMBINANT DNA IM: CPT | Performed by: INTERNAL MEDICINE

## 2018-01-01 PROCEDURE — 87081 CULTURE SCREEN ONLY: CPT

## 2018-01-01 PROCEDURE — 72148 MRI LUMBAR SPINE W/O DYE: CPT

## 2018-01-01 PROCEDURE — 85610 PROTHROMBIN TIME: CPT

## 2018-01-01 RX ORDER — DIPHENHYDRAMINE HYDROCHLORIDE 50 MG/ML
25 INJECTION INTRAMUSCULAR; INTRAVENOUS ONCE
Status: CANCELLED | OUTPATIENT
Start: 2018-01-01 | End: 2018-01-01

## 2018-01-01 RX ORDER — 0.9 % SODIUM CHLORIDE 0.9 %
250 INTRAVENOUS SOLUTION INTRAVENOUS ONCE
Status: DISCONTINUED | OUTPATIENT
Start: 2018-01-01 | End: 2018-01-01 | Stop reason: HOSPADM

## 2018-01-01 RX ORDER — ALBUTEROL SULFATE 2.5 MG/3ML
2.5 SOLUTION RESPIRATORY (INHALATION) 4 TIMES DAILY
Status: DISCONTINUED | OUTPATIENT
Start: 2018-01-01 | End: 2018-01-01

## 2018-01-01 RX ORDER — SODIUM CHLORIDE 0.9 % (FLUSH) 0.9 %
10 SYRINGE (ML) INJECTION EVERY 12 HOURS SCHEDULED
Status: DISCONTINUED | OUTPATIENT
Start: 2018-01-01 | End: 2018-01-01 | Stop reason: HOSPADM

## 2018-01-01 RX ORDER — LOPERAMIDE HYDROCHLORIDE 2 MG/1
2 CAPSULE ORAL 4 TIMES DAILY PRN
Status: DISCONTINUED | OUTPATIENT
Start: 2018-01-01 | End: 2018-01-01 | Stop reason: HOSPADM

## 2018-01-01 RX ORDER — DIPHENHYDRAMINE HYDROCHLORIDE 50 MG/ML
50 INJECTION INTRAMUSCULAR; INTRAVENOUS ONCE
Status: CANCELLED | OUTPATIENT
Start: 2018-01-01 | End: 2018-01-01

## 2018-01-01 RX ORDER — 0.9 % SODIUM CHLORIDE 0.9 %
10 VIAL (ML) INJECTION ONCE
Status: CANCELLED | OUTPATIENT
Start: 2018-01-01 | End: 2018-01-01

## 2018-01-01 RX ORDER — METHYLPREDNISOLONE SODIUM SUCCINATE 125 MG/2ML
125 INJECTION, POWDER, LYOPHILIZED, FOR SOLUTION INTRAMUSCULAR; INTRAVENOUS ONCE
Status: CANCELLED | OUTPATIENT
Start: 2018-01-01 | End: 2018-01-01

## 2018-01-01 RX ORDER — SODIUM CHLORIDE 9 MG/ML
INJECTION, SOLUTION INTRAVENOUS CONTINUOUS
Status: CANCELLED | OUTPATIENT
Start: 2018-01-01

## 2018-01-01 RX ORDER — POTASSIUM CHLORIDE 7.45 MG/ML
10 INJECTION INTRAVENOUS PRN
Status: DISCONTINUED | OUTPATIENT
Start: 2018-01-01 | End: 2018-01-01 | Stop reason: HOSPADM

## 2018-01-01 RX ORDER — SODIUM CHLORIDE 0.9 % (FLUSH) 0.9 %
20 SYRINGE (ML) INJECTION PRN
Status: CANCELLED | OUTPATIENT
Start: 2018-01-01

## 2018-01-01 RX ORDER — GABAPENTIN 300 MG/1
600 CAPSULE ORAL 2 TIMES DAILY
Qty: 90 CAPSULE | Refills: 3
Start: 2018-01-01 | End: 2018-01-01

## 2018-01-01 RX ORDER — ACETAMINOPHEN 325 MG/1
650 TABLET ORAL ONCE
Status: CANCELLED | OUTPATIENT
Start: 2018-01-01 | End: 2018-01-01

## 2018-01-01 RX ORDER — DIPHENHYDRAMINE HCL 25 MG
25 TABLET ORAL ONCE
Status: CANCELLED | OUTPATIENT
Start: 2018-01-01 | End: 2018-01-01

## 2018-01-01 RX ORDER — ACETAMINOPHEN 325 MG/1
650 TABLET ORAL ONCE
Status: COMPLETED | OUTPATIENT
Start: 2018-01-01 | End: 2018-01-01

## 2018-01-01 RX ORDER — ALBUTEROL SULFATE 90 UG/1
2 AEROSOL, METERED RESPIRATORY (INHALATION) EVERY 4 HOURS PRN
Qty: 3 INHALER | Refills: 3 | Status: ON HOLD | OUTPATIENT
Start: 2018-01-01 | End: 2019-01-01 | Stop reason: HOSPADM

## 2018-01-01 RX ORDER — ONDANSETRON 4 MG/1
4 TABLET, FILM COATED ORAL EVERY 6 HOURS PRN
Status: DISCONTINUED | OUTPATIENT
Start: 2018-01-01 | End: 2018-01-01 | Stop reason: HOSPADM

## 2018-01-01 RX ORDER — SODIUM CHLORIDE 9 MG/ML
INJECTION, SOLUTION INTRAVENOUS CONTINUOUS
Status: DISCONTINUED | OUTPATIENT
Start: 2018-01-01 | End: 2018-01-01 | Stop reason: HOSPADM

## 2018-01-01 RX ORDER — ALBUTEROL SULFATE 2.5 MG/3ML
2.5 SOLUTION RESPIRATORY (INHALATION) EVERY 4 HOURS PRN
Qty: 120 EACH | Refills: 3 | Status: ON HOLD
Start: 2018-01-01 | End: 2019-01-01 | Stop reason: HOSPADM

## 2018-01-01 RX ORDER — SODIUM CHLORIDE 9 MG/ML
INJECTION, SOLUTION INTRAVENOUS CONTINUOUS
Status: DISCONTINUED | OUTPATIENT
Start: 2018-01-01 | End: 2018-01-01

## 2018-01-01 RX ORDER — ACETAMINOPHEN 325 MG/1
650 TABLET ORAL EVERY 6 HOURS PRN
Status: DISCONTINUED | OUTPATIENT
Start: 2018-01-01 | End: 2018-01-01 | Stop reason: HOSPADM

## 2018-01-01 RX ORDER — LANSOPRAZOLE 15 MG/1
15 CAPSULE, DELAYED RELEASE ORAL DAILY
COMMUNITY

## 2018-01-01 RX ORDER — AZITHROMYCIN 500 MG/1
TABLET, FILM COATED ORAL
Qty: 12 TABLET | Refills: 0 | Status: ON HOLD | OUTPATIENT
Start: 2018-01-01 | End: 2018-01-01 | Stop reason: HOSPADM

## 2018-01-01 RX ORDER — GABAPENTIN 300 MG/1
300 CAPSULE ORAL DAILY
Status: DISCONTINUED | OUTPATIENT
Start: 2018-01-01 | End: 2018-01-01 | Stop reason: HOSPADM

## 2018-01-01 RX ORDER — INHALER, ASSIST DEVICES
SPACER (EA) MISCELLANEOUS
Qty: 1 EACH | Refills: 3 | Status: SHIPPED | OUTPATIENT
Start: 2018-01-01

## 2018-01-01 RX ORDER — PREDNISONE 10 MG/1
TABLET ORAL
Qty: 90 TABLET | Refills: 0 | Status: SHIPPED | OUTPATIENT
Start: 2018-01-01 | End: 2018-01-01 | Stop reason: SDUPTHER

## 2018-01-01 RX ORDER — ACYCLOVIR 800 MG/1
800 TABLET ORAL DAILY
Qty: 30 TABLET | Refills: 0
Start: 2018-01-01

## 2018-01-01 RX ORDER — SODIUM CHLORIDE 0.9 % (FLUSH) 0.9 %
20 SYRINGE (ML) INJECTION PRN
Status: DISCONTINUED | OUTPATIENT
Start: 2018-01-01 | End: 2018-01-01 | Stop reason: HOSPADM

## 2018-01-01 RX ORDER — ONDANSETRON 4 MG/1
4 TABLET, FILM COATED ORAL EVERY 6 HOURS
COMMUNITY

## 2018-01-01 RX ORDER — SODIUM CHLORIDE 9 MG/ML
500 INJECTION, SOLUTION INTRAVENOUS CONTINUOUS PRN
Status: DISCONTINUED | OUTPATIENT
Start: 2018-01-01 | End: 2018-01-01 | Stop reason: HOSPADM

## 2018-01-01 RX ORDER — FUROSEMIDE 10 MG/ML
20 INJECTION INTRAMUSCULAR; INTRAVENOUS ONCE
Status: CANCELLED | OUTPATIENT
Start: 2018-01-01 | End: 2018-01-01

## 2018-01-01 RX ORDER — HEPARIN SODIUM (PORCINE) LOCK FLUSH IV SOLN 100 UNIT/ML 100 UNIT/ML
300 SOLUTION INTRAVENOUS PRN
Status: DISCONTINUED | OUTPATIENT
Start: 2018-01-01 | End: 2018-01-01 | Stop reason: HOSPADM

## 2018-01-01 RX ORDER — EPINEPHRINE 1 MG/ML
0.3 INJECTION, SOLUTION, CONCENTRATE INTRAVENOUS PRN
Status: CANCELLED | OUTPATIENT
Start: 2018-01-01

## 2018-01-01 RX ORDER — SODIUM CHLORIDE 0.9 % (FLUSH) 0.9 %
10 SYRINGE (ML) INJECTION PRN
Status: DISCONTINUED | OUTPATIENT
Start: 2018-01-01 | End: 2018-01-01 | Stop reason: HOSPADM

## 2018-01-01 RX ORDER — CLONIDINE HYDROCHLORIDE 0.1 MG/1
0.1 TABLET ORAL 2 TIMES DAILY
Status: DISCONTINUED | OUTPATIENT
Start: 2018-01-01 | End: 2018-01-01 | Stop reason: HOSPADM

## 2018-01-01 RX ORDER — GABAPENTIN 100 MG/1
200 CAPSULE ORAL DAILY
Qty: 90 CAPSULE | Refills: 3
Start: 2018-01-01 | End: 2018-01-01

## 2018-01-01 RX ORDER — ACYCLOVIR 800 MG/1
800 TABLET ORAL DAILY
Status: DISCONTINUED | OUTPATIENT
Start: 2018-01-01 | End: 2018-01-01 | Stop reason: HOSPADM

## 2018-01-01 RX ORDER — GABAPENTIN 100 MG/1
200 CAPSULE ORAL
Status: ON HOLD | COMMUNITY
End: 2018-01-01 | Stop reason: HOSPADM

## 2018-01-01 RX ORDER — GABAPENTIN 100 MG/1
200 CAPSULE ORAL DAILY
Status: DISCONTINUED | OUTPATIENT
Start: 2018-01-01 | End: 2018-01-01 | Stop reason: HOSPADM

## 2018-01-01 RX ORDER — GABAPENTIN 300 MG/1
600 CAPSULE ORAL 2 TIMES DAILY
Status: DISCONTINUED | OUTPATIENT
Start: 2018-01-01 | End: 2018-01-01 | Stop reason: HOSPADM

## 2018-01-01 RX ORDER — CLONAZEPAM 1 MG/1
1 TABLET ORAL NIGHTLY
Status: DISCONTINUED | OUTPATIENT
Start: 2018-01-01 | End: 2018-01-01 | Stop reason: HOSPADM

## 2018-01-01 RX ORDER — MODAFINIL 100 MG/1
100 TABLET ORAL DAILY
Status: DISCONTINUED | OUTPATIENT
Start: 2018-01-01 | End: 2018-01-01 | Stop reason: HOSPADM

## 2018-01-01 RX ORDER — POTASSIUM CHLORIDE 29.8 MG/ML
80 INJECTION INTRAVENOUS PRN
Status: DISCONTINUED | OUTPATIENT
Start: 2018-01-01 | End: 2018-01-01

## 2018-01-01 RX ORDER — OYSTER SHELL CALCIUM WITH VITAMIN D 500; 200 MG/1; [IU]/1
1 TABLET, FILM COATED ORAL
Status: DISCONTINUED | OUTPATIENT
Start: 2018-01-01 | End: 2018-01-01 | Stop reason: HOSPADM

## 2018-01-01 RX ORDER — PREDNISONE 10 MG/1
TABLET ORAL
Qty: 90 TABLET | Refills: 0 | Status: ON HOLD | OUTPATIENT
Start: 2018-01-01 | End: 2019-01-01 | Stop reason: HOSPADM

## 2018-01-01 RX ORDER — ISOSORBIDE MONONITRATE 30 MG/1
30 TABLET, EXTENDED RELEASE ORAL DAILY
Status: DISCONTINUED | OUTPATIENT
Start: 2018-01-01 | End: 2018-01-01 | Stop reason: HOSPADM

## 2018-01-01 RX ORDER — SULFAMETHOXAZOLE AND TRIMETHOPRIM 800; 160 MG/1; MG/1
1 TABLET ORAL
Status: ON HOLD | COMMUNITY
Start: 2018-01-01 | End: 2019-01-01 | Stop reason: HOSPADM

## 2018-01-01 RX ORDER — MAGNESIUM SULFATE IN WATER 40 MG/ML
4 INJECTION, SOLUTION INTRAVENOUS PRN
Status: DISCONTINUED | OUTPATIENT
Start: 2018-01-01 | End: 2018-01-01 | Stop reason: HOSPADM

## 2018-01-01 RX ORDER — ALBUTEROL SULFATE 2.5 MG/3ML
2.5 SOLUTION RESPIRATORY (INHALATION) EVERY 4 HOURS PRN
Status: DISCONTINUED | OUTPATIENT
Start: 2018-01-01 | End: 2018-01-01 | Stop reason: HOSPADM

## 2018-01-01 RX ORDER — GABAPENTIN 300 MG/1
300 CAPSULE ORAL 3 TIMES DAILY
Status: DISCONTINUED | OUTPATIENT
Start: 2018-01-01 | End: 2018-01-01

## 2018-01-01 RX ORDER — ACETAMINOPHEN 325 MG/1
650 TABLET ORAL ONCE
Status: DISCONTINUED | OUTPATIENT
Start: 2018-01-01 | End: 2018-01-01 | Stop reason: HOSPADM

## 2018-01-01 RX ORDER — ONDANSETRON 4 MG/1
4 TABLET, FILM COATED ORAL EVERY 6 HOURS
Status: DISCONTINUED | OUTPATIENT
Start: 2018-01-01 | End: 2018-01-01

## 2018-01-01 RX ORDER — LANOLIN ALCOHOL/MO/W.PET/CERES
400 CREAM (GRAM) TOPICAL DAILY
Status: DISCONTINUED | OUTPATIENT
Start: 2018-01-01 | End: 2018-01-01 | Stop reason: HOSPADM

## 2018-01-01 RX ORDER — PIMECROLIMUS 10 MG/G
CREAM TOPICAL
Qty: 60 G | Refills: 2
Start: 2018-01-01

## 2018-01-01 RX ORDER — ACETAMINOPHEN 325 MG/1
650 TABLET ORAL EVERY 6 HOURS PRN
COMMUNITY

## 2018-01-01 RX ORDER — 0.9 % SODIUM CHLORIDE 0.9 %
500 INTRAVENOUS SOLUTION INTRAVENOUS ONCE
Status: DISCONTINUED | OUTPATIENT
Start: 2018-01-01 | End: 2018-01-01 | Stop reason: HOSPADM

## 2018-01-01 RX ORDER — PREDNISONE 10 MG/1
TABLET ORAL
Status: ON HOLD | COMMUNITY
Start: 2018-01-01 | End: 2019-01-01 | Stop reason: HOSPADM

## 2018-01-01 RX ORDER — GABAPENTIN 300 MG/1
300 CAPSULE ORAL DAILY
Qty: 90 CAPSULE | Refills: 3
Start: 2018-01-01 | End: 2018-01-01

## 2018-01-01 RX ORDER — CETIRIZINE HYDROCHLORIDE 10 MG/1
10 TABLET ORAL DAILY
Status: DISCONTINUED | OUTPATIENT
Start: 2018-01-01 | End: 2018-01-01 | Stop reason: HOSPADM

## 2018-01-01 RX ORDER — CLONAZEPAM 0.5 MG/1
1-2 TABLET ORAL NIGHTLY PRN
Qty: 30 TABLET | Refills: 0
Start: 2018-01-01 | End: 2019-01-01

## 2018-01-01 RX ORDER — ACYCLOVIR 800 MG/1
800 TABLET ORAL 2 TIMES DAILY
Status: DISCONTINUED | OUTPATIENT
Start: 2018-01-01 | End: 2018-01-01

## 2018-01-01 RX ORDER — FLUTICASONE PROPIONATE 50 MCG
2 SPRAY, SUSPENSION (ML) NASAL DAILY
Status: DISCONTINUED | OUTPATIENT
Start: 2018-01-01 | End: 2018-01-01 | Stop reason: HOSPADM

## 2018-01-01 RX ORDER — SULFAMETHOXAZOLE AND TRIMETHOPRIM 800; 160 MG/1; MG/1
1 TABLET ORAL
Status: ON HOLD | COMMUNITY
End: 2019-01-01 | Stop reason: HOSPADM

## 2018-01-01 RX ORDER — LIDOCAINE 50 MG/G
1 PATCH TOPICAL DAILY PRN
Qty: 30 PATCH | Refills: 0
Start: 2018-01-01

## 2018-01-01 RX ORDER — BUDESONIDE 0.5 MG/2ML
1 INHALANT ORAL 2 TIMES DAILY
Status: DISCONTINUED | OUTPATIENT
Start: 2018-01-01 | End: 2018-01-01

## 2018-01-01 RX ORDER — OXYCODONE HYDROCHLORIDE 5 MG/1
5 TABLET ORAL EVERY 4 HOURS PRN
Status: DISCONTINUED | OUTPATIENT
Start: 2018-01-01 | End: 2018-01-01 | Stop reason: HOSPADM

## 2018-01-01 RX ORDER — PROCHLORPERAZINE MALEATE 10 MG
10 TABLET ORAL EVERY 6 HOURS PRN
Status: DISCONTINUED | OUTPATIENT
Start: 2018-01-01 | End: 2018-01-01 | Stop reason: HOSPADM

## 2018-01-01 RX ADMIN — SODIUM CHLORIDE 1000 MG: 900 INJECTION INTRAVENOUS at 10:39

## 2018-01-01 RX ADMIN — CLONIDINE HYDROCHLORIDE 0.1 MG: 0.1 TABLET ORAL at 20:37

## 2018-01-01 RX ADMIN — CLONIDINE HYDROCHLORIDE 0.1 MG: 0.1 TABLET ORAL at 08:30

## 2018-01-01 RX ADMIN — ISOSORBIDE MONONITRATE 30 MG: 30 TABLET, EXTENDED RELEASE ORAL at 08:23

## 2018-01-01 RX ADMIN — ACETAMINOPHEN 650 MG: 325 TABLET ORAL at 16:17

## 2018-01-01 RX ADMIN — ONDANSETRON HYDROCHLORIDE 4 MG: 4 TABLET, FILM COATED ORAL at 18:36

## 2018-01-01 RX ADMIN — GABAPENTIN 600 MG: 300 CAPSULE ORAL at 08:23

## 2018-01-01 RX ADMIN — CLONIDINE HYDROCHLORIDE 0.1 MG: 0.1 TABLET ORAL at 21:09

## 2018-01-01 RX ADMIN — SODIUM CHLORIDE: 9 INJECTION, SOLUTION INTRAVENOUS at 17:40

## 2018-01-01 RX ADMIN — ISOSORBIDE MONONITRATE 30 MG: 30 TABLET, EXTENDED RELEASE ORAL at 21:09

## 2018-01-01 RX ADMIN — ACETAMINOPHEN 650 MG: 325 TABLET ORAL at 12:19

## 2018-01-01 RX ADMIN — SODIUM CHLORIDE: 9 INJECTION, SOLUTION INTRAVENOUS at 08:26

## 2018-01-01 RX ADMIN — ISOSORBIDE MONONITRATE 30 MG: 30 TABLET, EXTENDED RELEASE ORAL at 08:32

## 2018-01-01 RX ADMIN — ACYCLOVIR 800 MG: 800 TABLET ORAL at 08:23

## 2018-01-01 RX ADMIN — SODIUM CHLORIDE: 9 INJECTION, SOLUTION INTRAVENOUS at 16:06

## 2018-01-01 RX ADMIN — CETIRIZINE HYDROCHLORIDE 10 MG: 10 TABLET, FILM COATED ORAL at 08:33

## 2018-01-01 RX ADMIN — MEROPENEM 1 G: 1 INJECTION, POWDER, FOR SOLUTION INTRAVENOUS at 10:01

## 2018-01-01 RX ADMIN — OYSTER SHELL CALCIUM WITH VITAMIN D 1 TABLET: 500; 200 TABLET, FILM COATED ORAL at 08:23

## 2018-01-01 RX ADMIN — ACYCLOVIR 800 MG: 800 TABLET ORAL at 09:10

## 2018-01-01 RX ADMIN — CLONIDINE HYDROCHLORIDE 0.1 MG: 0.1 TABLET ORAL at 08:33

## 2018-01-01 RX ADMIN — CETIRIZINE HYDROCHLORIDE 10 MG: 10 TABLET, FILM COATED ORAL at 09:11

## 2018-01-01 RX ADMIN — ACETAMINOPHEN 650 MG: 325 TABLET, FILM COATED ORAL at 15:21

## 2018-01-01 RX ADMIN — Medication 10 ML: at 01:28

## 2018-01-01 RX ADMIN — ACYCLOVIR 800 MG: 800 TABLET ORAL at 21:09

## 2018-01-01 RX ADMIN — MEROPENEM 1 G: 1 INJECTION, POWDER, FOR SOLUTION INTRAVENOUS at 13:04

## 2018-01-01 RX ADMIN — FOLIC ACID TAB 400 MCG 400 MCG: 400 TAB at 08:23

## 2018-01-01 RX ADMIN — FOLIC ACID TAB 400 MCG 400 MCG: 400 TAB at 09:10

## 2018-01-01 RX ADMIN — CLONAZEPAM 1 MG: 1 TABLET ORAL at 21:09

## 2018-01-01 RX ADMIN — GABAPENTIN 200 MG: 100 CAPSULE ORAL at 12:52

## 2018-01-01 RX ADMIN — GABAPENTIN 300 MG: 300 CAPSULE ORAL at 17:39

## 2018-01-01 RX ADMIN — MEROPENEM 1 G: 1 INJECTION, POWDER, FOR SOLUTION INTRAVENOUS at 19:35

## 2018-01-01 RX ADMIN — GABAPENTIN 600 MG: 300 CAPSULE ORAL at 08:33

## 2018-01-01 RX ADMIN — MEROPENEM 1 G: 1 INJECTION, POWDER, FOR SOLUTION INTRAVENOUS at 02:44

## 2018-01-01 RX ADMIN — GABAPENTIN 200 MG: 100 CAPSULE ORAL at 10:46

## 2018-01-01 RX ADMIN — SODIUM CHLORIDE: 9 INJECTION, SOLUTION INTRAVENOUS at 08:49

## 2018-01-01 RX ADMIN — GABAPENTIN 300 MG: 300 CAPSULE ORAL at 17:33

## 2018-01-01 RX ADMIN — Medication 10 ML: at 08:24

## 2018-01-01 RX ADMIN — Medication 10 ML: at 21:06

## 2018-01-01 RX ADMIN — OYSTER SHELL CALCIUM WITH VITAMIN D 1 TABLET: 500; 200 TABLET, FILM COATED ORAL at 09:10

## 2018-01-01 RX ADMIN — OYSTER SHELL CALCIUM WITH VITAMIN D 1 TABLET: 500; 200 TABLET, FILM COATED ORAL at 08:32

## 2018-01-01 RX ADMIN — FOLIC ACID TAB 400 MCG 400 MCG: 400 TAB at 08:33

## 2018-01-01 RX ADMIN — GABAPENTIN 600 MG: 300 CAPSULE ORAL at 21:09

## 2018-01-01 RX ADMIN — CLONAZEPAM 1 MG: 1 TABLET ORAL at 20:37

## 2018-01-01 RX ADMIN — Medication 10 ML: at 08:34

## 2018-01-01 RX ADMIN — GABAPENTIN 600 MG: 300 CAPSULE ORAL at 20:37

## 2018-01-01 RX ADMIN — CLONIDINE HYDROCHLORIDE 0.1 MG: 0.1 TABLET ORAL at 09:10

## 2018-01-01 RX ADMIN — GABAPENTIN 200 MG: 100 CAPSULE ORAL at 11:33

## 2018-01-01 RX ADMIN — GABAPENTIN 600 MG: 300 CAPSULE ORAL at 21:06

## 2018-01-01 RX ADMIN — MODAFINIL 100 MG: 100 TABLET ORAL at 07:29

## 2018-01-01 RX ADMIN — ACETAMINOPHEN 650 MG: 325 TABLET ORAL at 11:07

## 2018-01-01 RX ADMIN — SODIUM CHLORIDE: 9 INJECTION, SOLUTION INTRAVENOUS at 01:28

## 2018-01-01 RX ADMIN — CETIRIZINE HYDROCHLORIDE 10 MG: 10 TABLET, FILM COATED ORAL at 08:26

## 2018-01-01 RX ADMIN — MEROPENEM 1 G: 1 INJECTION, POWDER, FOR SOLUTION INTRAVENOUS at 21:34

## 2018-01-01 RX ADMIN — ACYCLOVIR 800 MG: 800 TABLET ORAL at 20:37

## 2018-01-01 RX ADMIN — Medication 10 ML: at 21:10

## 2018-01-01 RX ADMIN — ACYCLOVIR 800 MG: 800 TABLET ORAL at 08:33

## 2018-01-01 RX ADMIN — CLONAZEPAM 1 MG: 1 TABLET ORAL at 21:06

## 2018-01-01 RX ADMIN — ACETAMINOPHEN 650 MG: 325 TABLET ORAL at 16:06

## 2018-01-01 RX ADMIN — MODAFINIL 100 MG: 100 TABLET ORAL at 07:56

## 2018-01-01 RX ADMIN — GABAPENTIN 600 MG: 300 CAPSULE ORAL at 09:10

## 2018-01-01 RX ADMIN — Medication 10 ML: at 09:13

## 2018-01-01 RX ADMIN — CLONIDINE HYDROCHLORIDE 0.1 MG: 0.1 TABLET ORAL at 21:06

## 2018-01-01 RX ADMIN — MEROPENEM 1 G: 1 INJECTION, POWDER, FOR SOLUTION INTRAVENOUS at 18:03

## 2018-01-01 RX ADMIN — MEROPENEM 1 G: 1 INJECTION, POWDER, FOR SOLUTION INTRAVENOUS at 01:30

## 2018-01-01 RX ADMIN — ISOSORBIDE MONONITRATE 30 MG: 30 TABLET, EXTENDED RELEASE ORAL at 09:10

## 2018-01-01 ASSESSMENT — PAIN SCALES - GENERAL
PAINLEVEL_OUTOF10: 0

## 2018-01-04 ENCOUNTER — TELEPHONE (OUTPATIENT)
Dept: PULMONOLOGY | Age: 63
End: 2018-01-04

## 2018-01-04 RX ORDER — PREDNISONE 10 MG/1
TABLET ORAL
Qty: 20 TABLET | Refills: 0 | Status: ON HOLD | OUTPATIENT
Start: 2018-01-04 | End: 2018-01-01 | Stop reason: HOSPADM

## 2018-01-04 RX ORDER — SULFAMETHOXAZOLE AND TRIMETHOPRIM 800; 160 MG/1; MG/1
1 TABLET ORAL 2 TIMES DAILY
Qty: 14 TABLET | Refills: 0 | Status: SHIPPED | OUTPATIENT
Start: 2018-01-04 | End: 2018-01-11

## 2018-01-11 ENCOUNTER — HOSPITAL ENCOUNTER (OUTPATIENT)
Dept: ONCOLOGY | Age: 63
Discharge: HOME | End: 2018-01-31
Attending: INTERNAL MEDICINE | Admitting: INTERNAL MEDICINE

## 2018-01-11 VITALS
HEART RATE: 91 BPM | SYSTOLIC BLOOD PRESSURE: 149 MMHG | TEMPERATURE: 98 F | RESPIRATION RATE: 18 BRPM | DIASTOLIC BLOOD PRESSURE: 93 MMHG

## 2018-01-11 LAB
RAPID INFLUENZA  B AGN: NEGATIVE
RAPID INFLUENZA A AGN: NEGATIVE

## 2018-01-19 ENCOUNTER — TELEPHONE (OUTPATIENT)
Dept: FAMILY MEDICINE CLINIC | Age: 63
End: 2018-01-19

## 2018-01-19 NOTE — TELEPHONE ENCOUNTER
Scanned request for PHI into Media and placed document in front office basket. Pt needs to complete a release of info form before we can send any documents to [facility].  We do not have one on file for this request. 1/19/18

## 2018-01-23 NOTE — TELEPHONE ENCOUNTER
Notified [facility] we need a Signed Release of Information from the pt before we can send any PHI to them. 1/23/18.

## 2018-01-31 ENCOUNTER — TELEPHONE (OUTPATIENT)
Dept: PULMONOLOGY | Age: 63
End: 2018-01-31

## 2018-01-31 NOTE — TELEPHONE ENCOUNTER
Please see previous messages from pt starting 1/12/18. He states he is getting worse and wants to either be seen by Dr Luz Dooley sooner than his March appt or talk to Dr Luz Dooley. Tried scheduling for 3/1 he said that is still too far out.      PLEASE CALL PATIENT

## 2018-02-01 ENCOUNTER — HOSPITAL ENCOUNTER (OUTPATIENT)
Dept: ONCOLOGY | Age: 63
Discharge: HOME | End: 2018-02-28
Attending: INTERNAL MEDICINE | Admitting: INTERNAL MEDICINE

## 2018-02-05 ENCOUNTER — OFFICE VISIT (OUTPATIENT)
Dept: PULMONOLOGY | Age: 63
End: 2018-02-05

## 2018-02-05 VITALS
BODY MASS INDEX: 23.19 KG/M2 | RESPIRATION RATE: 16 BRPM | WEIGHT: 162 LBS | OXYGEN SATURATION: 96 % | SYSTOLIC BLOOD PRESSURE: 130 MMHG | HEART RATE: 114 BPM | HEIGHT: 70 IN | DIASTOLIC BLOOD PRESSURE: 70 MMHG

## 2018-02-05 DIAGNOSIS — J21.9 BRONCHIOLITIS: ICD-10-CM

## 2018-02-05 DIAGNOSIS — R05.9 COUGH: ICD-10-CM

## 2018-02-05 DIAGNOSIS — C90.00 MULTIPLE MYELOMA, REMISSION STATUS UNSPECIFIED (HCC): Primary | ICD-10-CM

## 2018-02-05 PROCEDURE — 99213 OFFICE O/P EST LOW 20 MIN: CPT | Performed by: INTERNAL MEDICINE

## 2018-02-05 NOTE — PROGRESS NOTES
antibiotics. Indeed, his condition continues to worsen. There remains a question whether this may be attributable to the study drug that he is on for keeping myeloma in remission.       Plan:      High resolution CT scan looking for possible new lesions in the lung

## 2018-02-06 ENCOUNTER — HOSPITAL ENCOUNTER (OUTPATIENT)
Dept: CT IMAGING | Age: 63
Discharge: HOME | End: 2018-02-06
Attending: INTERNAL MEDICINE | Admitting: INTERNAL MEDICINE

## 2018-02-06 DIAGNOSIS — C90.00 MULTIPLE MYELOMA NOT HAVING ACHIEVED REMISSION (HCC): ICD-10-CM

## 2018-02-06 DIAGNOSIS — J21.9 BRONCHIOLITIS: ICD-10-CM

## 2018-02-06 DIAGNOSIS — C90.00 MULTIPLE MYELOMA, REMISSION STATUS UNSPECIFIED (HCC): ICD-10-CM

## 2018-02-06 DIAGNOSIS — R05.9 COUGH: ICD-10-CM

## 2018-02-07 ENCOUNTER — TELEPHONE (OUTPATIENT)
Dept: PULMONOLOGY | Age: 63
End: 2018-02-07

## 2018-02-07 DIAGNOSIS — J40 BRONCHITIS: ICD-10-CM

## 2018-02-07 DIAGNOSIS — J40 BRONCHITIS: Primary | ICD-10-CM

## 2018-02-09 LAB
CULTURE, RESPIRATORY: NORMAL
GRAM STAIN RESULT: NORMAL

## 2018-02-13 ENCOUNTER — TELEPHONE (OUTPATIENT)
Dept: PULMONOLOGY | Age: 63
End: 2018-02-13

## 2018-02-13 ENCOUNTER — OFFICE VISIT (OUTPATIENT)
Dept: FAMILY MEDICINE CLINIC | Age: 63
End: 2018-02-13

## 2018-02-13 VITALS
HEART RATE: 117 BPM | DIASTOLIC BLOOD PRESSURE: 78 MMHG | SYSTOLIC BLOOD PRESSURE: 132 MMHG | BODY MASS INDEX: 23.08 KG/M2 | HEIGHT: 70 IN | OXYGEN SATURATION: 98 % | WEIGHT: 161.2 LBS

## 2018-02-13 DIAGNOSIS — I10 ESSENTIAL HYPERTENSION: ICD-10-CM

## 2018-02-13 DIAGNOSIS — G47.00 INSOMNIA, UNSPECIFIED TYPE: Chronic | ICD-10-CM

## 2018-02-13 DIAGNOSIS — J21.9 BRONCHIOLITIS: Primary | ICD-10-CM

## 2018-02-13 DIAGNOSIS — M79.7 FIBROMYALGIA: Chronic | ICD-10-CM

## 2018-02-13 DIAGNOSIS — R79.89 CREATININE ELEVATION: Chronic | ICD-10-CM

## 2018-02-13 DIAGNOSIS — R05.9 COUGH: ICD-10-CM

## 2018-02-13 DIAGNOSIS — C79.51 SECONDARY MALIGNANT NEOPLASM OF BONE (HCC): ICD-10-CM

## 2018-02-13 DIAGNOSIS — Z12.11 SCREEN FOR COLON CANCER: ICD-10-CM

## 2018-02-13 DIAGNOSIS — F32.A ANXIETY AND DEPRESSION: Chronic | ICD-10-CM

## 2018-02-13 DIAGNOSIS — G47.33 OSA (OBSTRUCTIVE SLEEP APNEA): Chronic | ICD-10-CM

## 2018-02-13 DIAGNOSIS — F41.9 ANXIETY AND DEPRESSION: Chronic | ICD-10-CM

## 2018-02-13 DIAGNOSIS — C90.00 MULTIPLE MYELOMA, REMISSION STATUS UNSPECIFIED (HCC): ICD-10-CM

## 2018-02-13 PROCEDURE — 99214 OFFICE O/P EST MOD 30 MIN: CPT | Performed by: FAMILY MEDICINE

## 2018-02-13 RX ORDER — CLONAZEPAM 0.5 MG/1
1-2 TABLET ORAL 3 TIMES DAILY PRN
Qty: 270 TABLET | Refills: 1 | Status: ON HOLD | OUTPATIENT
Start: 2018-02-13 | End: 2018-01-01

## 2018-02-13 RX ORDER — AZITHROMYCIN 500 MG/1
TABLET, FILM COATED ORAL
Qty: 12 TABLET | Refills: 3 | Status: SHIPPED | OUTPATIENT
Start: 2018-02-13 | End: 2018-01-01 | Stop reason: SDUPTHER

## 2018-02-13 RX ORDER — CLONIDINE HYDROCHLORIDE 0.1 MG/1
0.1 TABLET ORAL 2 TIMES DAILY
Qty: 180 TABLET | Refills: 3 | Status: ON HOLD | OUTPATIENT
Start: 2018-02-13 | End: 2019-01-01 | Stop reason: HOSPADM

## 2018-02-13 RX ORDER — ISOSORBIDE MONONITRATE 10 MG/1
TABLET ORAL
Qty: 540 TABLET | Refills: 3 | Status: ON HOLD | OUTPATIENT
Start: 2018-02-13 | End: 2019-01-01 | Stop reason: HOSPADM

## 2018-02-13 RX ORDER — BUPROPION HYDROCHLORIDE 100 MG/1
200 TABLET ORAL 2 TIMES DAILY
Qty: 360 TABLET | Refills: 3 | Status: SHIPPED | OUTPATIENT
Start: 2018-02-13

## 2018-02-13 ASSESSMENT — PATIENT HEALTH QUESTIONNAIRE - PHQ9
1. LITTLE INTEREST OR PLEASURE IN DOING THINGS: 0
SUM OF ALL RESPONSES TO PHQ9 QUESTIONS 1 & 2: 0
2. FEELING DOWN, DEPRESSED OR HOPELESS: 0
SUM OF ALL RESPONSES TO PHQ QUESTIONS 1-9: 0

## 2018-02-13 NOTE — PROGRESS NOTES
1 capsule by mouth 4 times daily as needed for Diarrhea.  modafinil (PROVIGIL) 200 MG tablet Take 100-200 mg by mouth daily.  azithromycin (ZITHROMAX) 500 MG tablet One tab Mon, Wed, Fri 12 tablet 3     No current facility-administered medications for this visit. Lab Results   Component Value Date    CREATININE 1.8 (H) 08/10/2017    BUN 22 08/10/2017     08/10/2017    K 3.6 08/10/2017     (H) 08/10/2017    CO2 25 08/10/2017         ASSESSMENT / PLAN:    1. Bronchiolitis  Persistent cough sx, with negative CT for acute findings  Cont f/u with pulmonary, reviewed negative sputum cultures  Consider abx, await input from dr. Rod Citizen    2. Multiple myeloma, remission status unspecified ([facility]  F/u with heme/onc, in research protocol  Reviewed CT chest showing new lesion in T12 vertebrae    3. Fibromyalgia  Stable w/o flare    4. Secondary malignant neoplasm of bone ([facility]  complcations from MM  F/u with heme/onc    5. Essential hypertension  Stable @ goal  - cloNIDine (CATAPRES) 0.1 MG tablet; Take 1 tablet by mouth 2 times daily  Dispense: 180 tablet; Refill: 3  - isosorbide mononitrate (ISMO;MONOKET) 10 MG tablet; TAKE THREE TABLETS TWICE A DAY  Dispense: 540 tablet; Refill: 3    6. Anxiety and depression  Stable despite stressors  Cont wellbutrin and prn clonazepam  refills given as below  See CSM  - buPROPion (WELLBUTRIN) 100 MG tablet; Take 2 tablets by mouth 2 times daily  Dispense: 360 tablet; Refill: 3  - clonazePAM (KLONOPIN) 0.5 MG tablet; Take 2-4 tablets by mouth 3 times daily as needed (anxiety / sleep) for up to 90 days. Dispense: 270 tablet; Refill: 1    7. Insomnia, unspecified type  As above, see CSM  - clonazePAM (KLONOPIN) 0.5 MG tablet; Take 2-4 tablets by mouth 3 times daily as needed (anxiety / sleep) for up to 90 days. Dispense: 270 tablet; Refill: 1    8. FERNANDO (obstructive sleep apnea)  Cont CPAP    9.  Cough  As above, chronic w/o concerning findings on CT  Await f/u

## 2018-02-14 NOTE — TELEPHONE ENCOUNTER
Reviewed CT chest results, and sputum culture with normal daniel. He had taken a 5 day course of azithromycin, with some improvement.   Try azithromycin 500 mg TTW

## 2018-02-18 ENCOUNTER — PATIENT MESSAGE (OUTPATIENT)
Dept: FAMILY MEDICINE CLINIC | Age: 63
End: 2018-02-18

## 2018-02-19 NOTE — TELEPHONE ENCOUNTER
From: Sadie Oliver  To: Janeen Reyes MD  Sent: 2/18/2018 9:23 PM EST  Subject: Prescription Question      [facility] to Dr Delvin Jiang will not refill 3 RX's until you reply. They Copied me on this no reply? They do not feel these are the correct meds? I have been on them for 4 years. Can you take care of this. Thank you, [facility]   Prescription #: [id number]     Waiting for [address] Review  Before we can ship your medicine, we need to talk with your doctor's office to help us figure out how this medicine will safely work in your case. We have reached out multiple times but haven't heard back yet. You might want to contact their office to speed this process up so your order is not delayed any further. We'll alert you if we can't send the medicine.    CLONAZEPAM TABS     CLONIDINE HCL TABS

## 2018-02-19 NOTE — TELEPHONE ENCOUNTER
Per pharmacy, there was an increase in dose since last fill in Oct.  The max dose is 40 mg QD and with the increase, the pt will be at 60mg QD. Need clarification that 60mg QD is correct dosing. Please advise.

## 2018-02-20 ENCOUNTER — HOSPITAL ENCOUNTER (OUTPATIENT)
Dept: MRI IMAGING | Age: 63
Discharge: HOME | End: 2018-02-20
Admitting: INTERNAL MEDICINE

## 2018-02-20 DIAGNOSIS — C90.00 MULTIPLE MYELOMA, REMISSION STATUS UNSPECIFIED (HCC): ICD-10-CM

## 2018-02-20 DIAGNOSIS — C90.00 MULTIPLE MYELOMA NOT HAVING ACHIEVED REMISSION (HCC): ICD-10-CM

## 2018-02-23 NOTE — TELEPHONE ENCOUNTER
Pt called, stating that he got another message from [address], stating that they would NOT fill his isosorbide mononitrate (ISMO;MONOKET) 10 MG tablet, since above their regular quantity limit of 40 MG daily. Based on the last parts of this message, it sounds like we had already resolved the issue. Pt is asking that we call them once more to ensure that this has indeed been resolved. Pt is requesting return call with update, to [phone] when time permits. Thank you!

## 2018-02-27 ENCOUNTER — OFFICE VISIT (OUTPATIENT)
Dept: FAMILY MEDICINE CLINIC | Age: 63
End: 2018-02-27

## 2018-02-27 VITALS
RESPIRATION RATE: 16 BRPM | HEART RATE: 74 BPM | SYSTOLIC BLOOD PRESSURE: 136 MMHG | OXYGEN SATURATION: 92 % | TEMPERATURE: 97 F | DIASTOLIC BLOOD PRESSURE: 94 MMHG | HEIGHT: 70 IN | WEIGHT: 160.6 LBS | BODY MASS INDEX: 22.99 KG/M2

## 2018-02-27 DIAGNOSIS — C90.00 METASTATIC MULTIPLE MYELOMA TO BONE (HCC): ICD-10-CM

## 2018-02-27 DIAGNOSIS — R05.9 COUGH: ICD-10-CM

## 2018-02-27 DIAGNOSIS — Z01.818 PRE-OP EXAM: Primary | ICD-10-CM

## 2018-02-27 DIAGNOSIS — N18.2 CHRONIC RENAL IMPAIRMENT, STAGE 2 (MILD): ICD-10-CM

## 2018-02-27 DIAGNOSIS — C79.51 SECONDARY MALIGNANT NEOPLASM OF BONE (HCC): ICD-10-CM

## 2018-02-27 DIAGNOSIS — I10 ESSENTIAL HYPERTENSION: ICD-10-CM

## 2018-02-27 DIAGNOSIS — C90.02 MULTIPLE MYELOMA IN RELAPSE (HCC): ICD-10-CM

## 2018-02-27 LAB
A/G RATIO: 1.7 (ref 1.1–2.2)
ALBUMIN SERPL-MCNC: 4.5 G/DL (ref 3.4–5)
ALP BLD-CCNC: 108 U/L (ref 40–129)
ALT SERPL-CCNC: 15 U/L (ref 10–40)
ANION GAP SERPL CALCULATED.3IONS-SCNC: 14 MMOL/L (ref 3–16)
APTT: 39.1 SEC (ref 24.1–34.9)
AST SERPL-CCNC: 17 U/L (ref 15–37)
BILIRUB SERPL-MCNC: 0.5 MG/DL (ref 0–1)
BUN BLDV-MCNC: 22 MG/DL (ref 7–20)
CALCIUM SERPL-MCNC: 8.9 MG/DL (ref 8.3–10.6)
CHLORIDE BLD-SCNC: 101 MMOL/L (ref 99–110)
CO2: 26 MMOL/L (ref 21–32)
CREAT SERPL-MCNC: 1.8 MG/DL (ref 0.8–1.3)
GFR AFRICAN AMERICAN: 46
GFR NON-AFRICAN AMERICAN: 38
GLOBULIN: 2.7 G/DL
GLUCOSE BLD-MCNC: 100 MG/DL (ref 70–99)
HCT VFR BLD CALC: 50.9 % (ref 40.5–52.5)
HEMOGLOBIN: 17.6 G/DL (ref 13.5–17.5)
INR BLD: 1 (ref 0.85–1.15)
MCH RBC QN AUTO: 35.5 PG (ref 26–34)
MCHC RBC AUTO-ENTMCNC: 34.6 G/DL (ref 31–36)
MCV RBC AUTO: 102.7 FL (ref 80–100)
PDW BLD-RTO: 14.7 % (ref 12.4–15.4)
PLATELET # BLD: 115 K/UL (ref 135–450)
PMV BLD AUTO: 8.1 FL (ref 5–10.5)
POTASSIUM SERPL-SCNC: 3.9 MMOL/L (ref 3.5–5.1)
PROTHROMBIN TIME: 11.3 SEC (ref 9.6–13)
RBC # BLD: 4.95 M/UL (ref 4.2–5.9)
SODIUM BLD-SCNC: 141 MMOL/L (ref 136–145)
TOTAL PROTEIN: 7.2 G/DL (ref 6.4–8.2)
WBC # BLD: 3.9 K/UL (ref 4–11)

## 2018-02-27 PROCEDURE — 99244 OFF/OP CNSLTJ NEW/EST MOD 40: CPT | Performed by: FAMILY MEDICINE

## 2018-02-27 PROCEDURE — 93000 ELECTROCARDIOGRAM COMPLETE: CPT | Performed by: FAMILY MEDICINE

## 2018-02-27 NOTE — PROGRESS NOTES
Subjective:    Chief Complaint:     Christy Palumbo is a 58 y.o. male who presents for a preoperative physical examination. He is scheduled to have kyphoplasty done by Dr. Benitez Cooley at [facility] on 3/1/2018. 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 Pt with moderate compressive sx and moderate pain. With the moderate cough and chest congestion, which has exac his back pain. Pt had MRI showing compression fx vs tumor in T12, and will be set for surgery with dr. Sofi Wong for kyphoplasty and biopsy    Pt is finished with the research protocol as he 'failed' the therapy with progression of disease     Pt also has lesion to L5, and anticipates that they will be treating with XRT. Pt on e-mycin MWF for treatment of chronic chest congestion and inflammation. Cough has improved but not gone. Pt continues to use inhaler / nebulizer TID-QID prn. Cough sx are improved. No current wheezing    Pt dealing with moderate stressors associated with above sx.   Pt does feel that he is coping ok with current therapy    History of Present Illness:        Past Medical History:   Diagnosis Date    Acute renal disease     Arthritis     Cough     CRI (chronic renal insufficiency) 12/21/2015    Fibromyalgia     GERD (gastroesophageal reflux disease)     Hyperlipidemia     Hypertension     Multiple myeloma in remission ([address] 11/16/2015    Plasmacytic myeloma (HCC)     thoracic vertebrae    Posterior tibial tendinitis of left leg     Posterior tibial tendinitis of right leg     Skin lesion     Sleep apnea     uses Bpap    Thrombocytopenia ([address] 7/20/2015        Review of patient's past surgical history indicates:     Past Surgical History:   Procedure Laterality Date    KIDNEY BIOPSY      OTHER SURGICAL HISTORY Left 10/17/14    INSERTION NEOSTAR CATHETER LEFT INTERNAL JUGULAR VEIN UNDER                                                   Current Outpatient Prescriptions   Medication Sig (ROXICODONE) 5 MG immediate release tablet Take one-half a tablet every 4-6 hours as needed for pain. 75 tablet 0    predniSONE (DELTASONE) 10 MG tablet 4 tabs daily x 1 week, 3 tabs x 1 week, 2 tabs daily 100 tablet 3    albuterol sulfate HFA (PROVENTIL HFA) 108 (90 BASE) MCG/ACT inhaler Inhale 2 puffs into the lungs every 4 hours as needed for Wheezing 1 Inhaler 5    Spacer/Aero-Holding Chambers (AEROCHAMBER PLUS-FLOW SIGNAL) MISC Use with inhaler as directed 1 each 3    triamcinolone (NASACORT) 55 MCG/ACT nasal inhaler 2 sprays by Nasal route daily 1 Inhaler 3    lidocaine (LIDODERM) 5 % Place 1 patch onto the skin daily 12 hours on, 12 hours off. 30 patch 0    diphenhydrAMINE-APAP (PERCOGESIC) 12.5-325 MG TABS Take by mouth as needed (Pain)      pimecrolimus (ELIDEL) 1 % cream Apply topically 2 times daily. 60 g 2    fluocinonide (LIDEX) 0.05 % cream APPLY TOPICALLY TWICE DAILY 30 g 1    esomeprazole (NEXIUM) 40 MG capsule Take 1 capsule twice a day 180 capsule 3    promethazine (PHENERGAN) 12.5 MG tablet Take 1 tablet every 6 hours as needed for nausea 30 tablet 0    mometasone (ELOCON) 0.1 % cream APPLY TOPICALLY DAILY 45 g 2    cetirizine (ZYRTEC) 10 MG tablet Take 1 tablet by mouth daily as needed for Allergies (itching).  loperamide (IMODIUM) 2 MG capsule Take 1 capsule by mouth 4 times daily as needed for Diarrhea.  modafinil (PROVIGIL) 200 MG tablet Take 100-200 mg by mouth daily.       omega-3 acid ethyl esters (LOVAZA) 1 g capsule Take 2 capsules by mouth 2 times daily 360 capsule 1    Doxepin HCl 3 MG TABS Take by mouth      dexamethasone (DECADRON) 4 MG tablet Take as directed per research 20 tablet 1    neomycin-polymyxin-hydrocortisone (CORTISPORIN) 3.5-49612-0 otic solution INSTILL 3 DROPS IN AFFECTED EAR(S) THREE TIMES DAILY FOR 10 DAYS 10 mL 0    sucralfate (CARAFATE) 1 GM/10ML suspension Take 10 mLs by mouth 4 times daily 500 mL 3    predniSONE (DELTASONE) 5 MG tablet Take 1 tab daily (Patient taking differently: 20 mg Take 1 tab daily) 100 tablet 0     No current facility-administered medications for this visit. Allergies   Allergen Reactions    Bystolic [Nebivolol Hcl] Shortness Of Breath     Chest tightness. Propranolol also same effect.  Ciprofloxacin Rash    Cardizem [Diltiazem Hcl] Swelling    Levaquin [Levofloxacin In D5w]     Levaquin [Levofloxacin] Other (See Comments)     Tendonitis d/t fibromialgia    Lisinopril Other (See Comments)     Cough chest pain and flare up of fibromyalgis with 5 mg   Chest pains    Nsaids     Pcn [Penicillins]     Propranolol Other (See Comments)     Chest Pain    Benadryl [Diphenhydramine] Anxiety     Pt states benadryl makes him hyperactive and anxious, unable to sleep    Clindamycin/Lincomycin Rash     Itching, skin peeling        Social History   Substance Use Topics    Smoking status: Never Smoker    Smokeless tobacco: Never Used    Alcohol use No        Family History   Problem Relation Age of Onset    Breast Cancer Mother     Coronary Art Dis Father     Hearing Loss Father     Hypertension Father     High Cholesterol Father         Review Of Systems    Skin: no abnormal pigmentation, rash, scaling, itching, masses, hair or nail changes  Eyes: negative  Ears/Nose/Throat: negative  Respiratory: negative  Cardiovascular: negative  Gastrointestinal: negative  Genitourinary: negative  Musculoskeletal: negative  Neurologic: negative  Psychiatric: negative  Hematologic/Lymphatic/Immunologic: negative  Endocrine: negative       Objective:      BP (!) 162/95   Pulse 74   Temp 97 °F (36.1 °C) (Oral)   Resp 16   Ht 5' 10\" (1.778 m)   Wt 160 lb 9.6 oz (72.8 kg)   SpO2 92%   BMI 23.04 kg/m²   General appearance - healthy, alert, no distress  Skin - Skin color, texture, turgor normal. No rashes or lesions. Head - Normocephalic. No masses, lesions, tenderness or abnormalities  Eyes - conjunctivae/corneas clear.

## 2018-02-28 VITALS — BODY MASS INDEX: 22.9 KG/M2 | WEIGHT: 160 LBS | HEIGHT: 70 IN

## 2018-02-28 RX ORDER — SODIUM CHLORIDE, SODIUM LACTATE, POTASSIUM CHLORIDE, CALCIUM CHLORIDE 600; 310; 30; 20 MG/100ML; MG/100ML; MG/100ML; MG/100ML
INJECTION, SOLUTION INTRAVENOUS CONTINUOUS
Status: CANCELLED | OUTPATIENT
Start: 2018-02-28

## 2018-02-28 NOTE — PROGRESS NOTES
you have not been preregistered yet. On the day of your procedure bring your insurance card and photo ID. You will be registered at your bedside once brought back to your room. 5. DO NOT EAT OR DRINK ANYTHING 8 hours prior to surgery per Dr. Nicole Baldwin. 6. MEDICATIONS    Take the following medications with a SMALL sip of water: Isosorbide, Wellbutrin, Zyrtec, Clonazepam, Clonidine, Acyclovir, Nexium. Use Breo and Nasonex per routine.  Use your usual dose of inhalers the morning of surgery. BRING your rescue inhaler with you to hospital.    Anesthesia does NOT want you to take insulin the morning of surgery. They will control your blood sugar while you are at the hospital. Please contact your ordering physician for instructions regarding your insulin the night before your procedure. If you have an insulin pump, please keep it set on basal rate. 7. Do not swallow water when brushing teeth. No gum, candy, mints or ice chips. Refrain from smoking or at least decrease the amount. 8. Dress in loose, comfortable clothing appropriate for redressing after your procedure. Do not wear jewelry (including body piercings), make-up (especially NO eye make-up), fingernail polish (NO toenail polish if foot/leg surgery), lotion, powders or metal hairclips. 9. Dentures, glasses, or contacts will need to be removed before your procedure. Bring cases for your glasses, contacts, dentures, or hearing aids to protect them while you are in surgery. 10. If you use a CPAP, please bring it with you on the day of your procedure. 11. We recommend that valuable personal  belongings, such as credit cards, cash, cell phones, e-tablets or jewelry, be left at home during your stay. The hospital will not be responsible for valuables that are not secured in the hospital safe.  However, if your insurance requires a co-pay, you may want to bring a method of payment, i.e. Check or credit card, if you wish to pay your co-pay the day of surgery. 12. If you are to stay overnight, you may bring a bag with personal items. Please have any large items you may need brought in by your family after your arrival to your hospital room. 15. If you have a Living Will or Durable Power of , please bring a copy on the day of your procedure. 15. With your permission, one family member may accompany you while you are being prepared for surgery. Once you are ready, additional family members may join you. HOW WE KEEP YOU SAFE and WORK TO PREVENT SURGICAL SITE INFECTIONS:  1. Health care workers should always check your ID bracelet to verify your name and birth date. You will be asked many times to state your name, date of birth, and allergies. 2. Health care workers should always clean their hands with soap or alcohol gel before providing care to you. It is okay to ask anyone if they cleaned their hands before they touch you. 3. You will be actively involved in verifying the type of procedure you are having and ensuring the correct surgical site. This will be confirmed multiple times prior to your procedure. Do NOT amairani your surgery site UNLESS instructed to by your surgeon. 4. Do not shave or wax for 72 hours prior to procedure near your operative site. Shaving with a razor can irritate your skin and make it easier to develop an infection. On the day of your procedure, any hair that needs to be removed near the surgical site will be clipped by a healthcare worker using a special clippers designed to avoid skin irritation. 5. When you are in the operating room, your surgical site will be cleansed with a special soap, and in most cases, you will be given an antibiotic before the surgery begins. AFTER YOUR PROCEDURE:  1. For comfort and safety, arrange to have someone at home with you for the first 24 hours after discharge.     2. You and your family will be given written instructions about your diet, activity, dressing

## 2018-03-01 ENCOUNTER — HOSPITAL ENCOUNTER (OUTPATIENT)
Dept: PREADMISSION TESTING | Age: 63
Discharge: HOME | End: 2018-03-02
Attending: NEUROLOGICAL SURGERY | Admitting: NEUROLOGICAL SURGERY

## 2018-03-02 ENCOUNTER — HOSPITAL ENCOUNTER (OUTPATIENT)
Dept: SURGERY | Age: 63
Discharge: HOME | End: 2018-03-02
Admitting: NEUROLOGICAL SURGERY

## 2018-03-02 VITALS
HEIGHT: 70 IN | SYSTOLIC BLOOD PRESSURE: 118 MMHG | BODY MASS INDEX: 22.9 KG/M2 | WEIGHT: 160 LBS | TEMPERATURE: 98 F | HEART RATE: 88 BPM | RESPIRATION RATE: 20 BRPM | DIASTOLIC BLOOD PRESSURE: 73 MMHG | OXYGEN SATURATION: 96 %

## 2018-03-02 DIAGNOSIS — M84.58XA PATHOLOGICAL FRACTURE OF THORACIC VERTEBRA DUE TO NEOPLASTIC DISEASE: ICD-10-CM

## 2018-03-02 LAB
ABO/RH: NORMAL
ANTIBODY SCREEN: NORMAL
BILIRUBIN URINE: NEGATIVE
BLOOD, URINE: ABNORMAL
CLARITY: CLEAR
COLOR: YELLOW
GLUCOSE URINE: 100 MG/DL
KETONES, URINE: NEGATIVE MG/DL
LEUKOCYTE ESTERASE, URINE: NEGATIVE
MICROSCOPIC EXAMINATION: YES
NITRITE, URINE: NEGATIVE
PH UA: 6.5
PROTEIN UA: NEGATIVE MG/DL
RBC UA: NORMAL /HPF (ref 0–2)
SPECIFIC GRAVITY UA: 1.01
URINE TYPE: ABNORMAL
UROBILINOGEN, URINE: 0.2 E.U./DL
WBC UA: NORMAL /HPF (ref 0–5)

## 2018-03-02 RX ORDER — ONDANSETRON 2 MG/ML
4 INJECTION INTRAMUSCULAR; INTRAVENOUS
Status: COMPLETED | OUTPATIENT
Start: 2018-03-02 | End: 2018-03-02

## 2018-03-02 RX ORDER — SODIUM CHLORIDE, SODIUM LACTATE, POTASSIUM CHLORIDE, CALCIUM CHLORIDE 600; 310; 30; 20 MG/100ML; MG/100ML; MG/100ML; MG/100ML
INJECTION, SOLUTION INTRAVENOUS CONTINUOUS
Status: DISCONTINUED | OUTPATIENT
Start: 2018-03-02 | End: 2018-03-03 | Stop reason: HOSPADM

## 2018-03-02 RX ORDER — MEPERIDINE HYDROCHLORIDE 25 MG/ML
12.5 INJECTION INTRAMUSCULAR; INTRAVENOUS; SUBCUTANEOUS EVERY 5 MIN PRN
Status: DISCONTINUED | OUTPATIENT
Start: 2018-03-02 | End: 2018-03-03 | Stop reason: HOSPADM

## 2018-03-02 RX ORDER — PROMETHAZINE HYDROCHLORIDE 25 MG/ML
6.25 INJECTION, SOLUTION INTRAMUSCULAR; INTRAVENOUS
Status: ACTIVE | OUTPATIENT
Start: 2018-03-02 | End: 2018-03-02

## 2018-03-02 RX ORDER — GLYCOPYRROLATE 0.2 MG/ML
0.1 INJECTION INTRAMUSCULAR; INTRAVENOUS ONCE
Status: DISCONTINUED | OUTPATIENT
Start: 2018-03-02 | End: 2018-03-03 | Stop reason: HOSPADM

## 2018-03-02 RX ORDER — ALBUTEROL SULFATE 2.5 MG/3ML
2.5 SOLUTION RESPIRATORY (INHALATION) EVERY 6 HOURS PRN
Status: DISCONTINUED | OUTPATIENT
Start: 2018-03-02 | End: 2018-03-03 | Stop reason: HOSPADM

## 2018-03-02 RX ORDER — FENTANYL CITRATE 50 UG/ML
25 INJECTION, SOLUTION INTRAMUSCULAR; INTRAVENOUS EVERY 5 MIN PRN
Status: DISCONTINUED | OUTPATIENT
Start: 2018-03-02 | End: 2018-03-03 | Stop reason: HOSPADM

## 2018-03-02 RX ORDER — OXYCODONE HYDROCHLORIDE AND ACETAMINOPHEN 5; 325 MG/1; MG/1
1 TABLET ORAL EVERY 6 HOURS PRN
Qty: 40 TABLET | Refills: 0 | Status: SHIPPED | OUTPATIENT
Start: 2018-03-02 | End: 2018-03-12

## 2018-03-02 RX ORDER — HYDRALAZINE HYDROCHLORIDE 20 MG/ML
5 INJECTION INTRAMUSCULAR; INTRAVENOUS EVERY 10 MIN PRN
Status: DISCONTINUED | OUTPATIENT
Start: 2018-03-02 | End: 2018-03-03 | Stop reason: HOSPADM

## 2018-03-02 RX ORDER — OXYCODONE HYDROCHLORIDE AND ACETAMINOPHEN 5; 325 MG/1; MG/1
1 TABLET ORAL ONCE
Status: DISCONTINUED | OUTPATIENT
Start: 2018-03-02 | End: 2018-03-03 | Stop reason: HOSPADM

## 2018-03-02 RX ORDER — DEXAMETHASONE SODIUM PHOSPHATE 4 MG/ML
10 INJECTION, SOLUTION INTRA-ARTICULAR; INTRALESIONAL; INTRAMUSCULAR; INTRAVENOUS; SOFT TISSUE ONCE
Status: COMPLETED | OUTPATIENT
Start: 2018-03-02 | End: 2018-03-02

## 2018-03-02 RX ORDER — NALOXONE HYDROCHLORIDE 0.4 MG/ML
INJECTION, SOLUTION INTRAMUSCULAR; INTRAVENOUS; SUBCUTANEOUS
Status: DISCONTINUED
Start: 2018-03-02 | End: 2018-03-02 | Stop reason: WASHOUT

## 2018-03-02 RX ADMIN — SODIUM CHLORIDE, SODIUM LACTATE, POTASSIUM CHLORIDE, CALCIUM CHLORIDE: 600; 310; 30; 20 INJECTION, SOLUTION INTRAVENOUS at 11:30

## 2018-03-02 RX ADMIN — ALBUTEROL SULFATE 2.5 MG: 2.5 SOLUTION RESPIRATORY (INHALATION) at 17:09

## 2018-03-02 RX ADMIN — DEXAMETHASONE SODIUM PHOSPHATE 10 MG: 4 INJECTION, SOLUTION INTRA-ARTICULAR; INTRALESIONAL; INTRAMUSCULAR; INTRAVENOUS; SOFT TISSUE at 12:39

## 2018-03-02 RX ADMIN — ONDANSETRON 4 MG: 2 INJECTION INTRAMUSCULAR; INTRAVENOUS at 18:45

## 2018-03-02 ASSESSMENT — PAIN SCALES - GENERAL
PAINLEVEL_OUTOF10: 0

## 2018-03-02 ASSESSMENT — PAIN - FUNCTIONAL ASSESSMENT
PAIN_FUNCTIONAL_ASSESSMENT: 0-10
PAIN_FUNCTIONAL_ASSESSMENT: 0-10

## 2018-03-02 ASSESSMENT — PAIN DESCRIPTION - DESCRIPTORS: DESCRIPTORS: ACHING;DISCOMFORT

## 2018-03-02 NOTE — PROGRESS NOTES
Respiratory therapist at bedside. Treatment being given  Dr Tita Rodriguez also at bedside. Pt's home BIPAP set up and oxygen bleeding in @ 4L.

## 2018-03-02 NOTE — BRIEF OP NOTE
[facility]    Brief Postoperative Note    Clark Kinney  YOB: 1955, 57 yo   [id number]    Pre-operative Diagnosis: Multiple Myeloma, T12 lesion Rt posterior VB    Post-operative Diagnosis: Same    Procedure: Rt VB Biopsy and Kyphoplasty T12, Bipedicular    Anesthesia: General    Surgeons: Chito Baltazar MD, PhD    Estimated Blood Loss: less than 50     Complications: None    Specimens: Was Obtained: Bone core and soft tissue/marrow    Findings: Soft VB      Electronically signed by Chito Baltazar MD on 3/2/2018 at 4:20 PM

## 2018-03-02 NOTE — PROGRESS NOTES
Current Allergies: Bystolic [nebivolol hcl]; Ciprofloxacin; Cardizem [diltiazem hcl]; Levaquin [levofloxacin in d5w]; Levaquin [levofloxacin]; Lisinopril; Nsaids; Propranolol; Benadryl [diphenhydramine]; Clindamycin/lincomycin; and Pcn [penicillins]    Admitted to PACU bed 7 from OR. Attached to PACU monitoring system. Alarms and parameters set. Report received from anesthesia personnel. OR staff reported did not report any observed skin issues. Athrombic wraps in place. Pt arrived with oxygen @ 4L/nc and very somnolent. Pt has history or severe sleep apnea. Uses bipap at home.  Received albuterol treatment prior to leaving OR

## 2018-03-02 NOTE — PROGRESS NOTES
PROGRESS NOTE    Per Dr. Russ Boeck discharge order, call me with any questions or if patient is not discharged.     Electronically signed by: CAMDEN Roblero, 3/2/2018 5:21 PM  [phone]

## 2018-03-02 NOTE — ANESTHESIA PRE-OP
uses Bpap    Stem cells transplant status ([facility] 2015    Thrombocytopenia ([facility] 7/20/2015      Exercise Tolerance   Patient is able to walk up a flight of stairs or 1-2 blocks without chest pain or shortness of breath? Yes   Anesthetic History   Personal History of Problems: No Family History of Problems: No   Surgical History    has a past surgical history that includes Kidney biopsy; other surgical history (Left, 10/17/14); and Fixation Kyphoplasty (01/2017). Social History   Social History     Occupational History          Social History Main Topics    Smoking status: Never Smoker    Smokeless tobacco: Never Used    Alcohol use No    Drug use: No    Sexual activity: Not Currently     Partners: Female        The medical history was obtained from the patient & the medical records. The nursing notes, primary physician's notes, and old charts (if applicable) were reviewed.     LABS   Basic Metabolic Profile Lab Results   Component Value Date     02/27/2018    K 3.9 02/27/2018     02/27/2018    CO2 26 02/27/2018    BUN 22 02/27/2018    CREATININE 1.8 02/27/2018    GLUCOSE 100 02/27/2018    GLUCOSE 96 08/10/2017    PHOS 3.2 08/10/2017    MG 2.1 08/10/2017      Complete Blood Count Lab Results   Component Value Date    WBC 3.9 (L) 02/27/2018    HGB 17.6 (H) 02/27/2018    HGB 13.3 (L) 08/10/2017    HGB 13.8 08/10/2017    HCT 50.9 02/27/2018    HCT 42.2 (L) 08/10/2017    HCT 39.7 (L) 08/10/2017     (L) 02/27/2018      Coagulation Studies Lab Results   Component Value Date    INR 1.00 02/27/2018    INR 0.95 01/10/2017      Blood Type O POS   STUDIES   EKG    Echo      MEDICATIONS   Home Medications   Patient's Medications   New Prescriptions    No medications on file   Previous Medications    ACYCLOVIR (ZOVIRAX) 800 MG TABLET    Take 1 tablet by mouth 2 times daily    ALBUTEROL (PROVENTIL) (2.5 MG/3ML) 0.083% NEBULIZER SOLUTION    Use every 4 hr as needed for chest congestion    ALBUTEROL sprays by Nasal route 2 times daily MUST BE GENERIC SANDOZ!!    OMEGA-3 ACID ETHYL ESTERS (LOVAZA) 1 G CAPSULE    Take 2 capsules by mouth 2 times daily    OXYCODONE (ROXICODONE) 5 MG IMMEDIATE RELEASE TABLET    Take one-half a tablet every 4-6 hours as needed for pain. PAMIDRONATE DISODIUM (AREDIA IV)    Infuse intravenously    PIMECROLIMUS (ELIDEL) 1 % CREAM    Apply topically 2 times daily. PREDNISONE (DELTASONE) 10 MG TABLET    4 tabs daily x 1 week, 3 tabs x 1 week, 2 tabs daily    PREDNISONE (DELTASONE) 10 MG TABLET    4 tabs daily x 5 days    PREDNISONE (DELTASONE) 5 MG TABLET    Take 1 tab daily    PROCHLORPERAZINE (COMPAZINE) 10 MG TABLET    Take 1 tablet by mouth every 6 hours as needed (nausea)    PROMETHAZINE (PHENERGAN) 12.5 MG TABLET    Take 1 tablet every 6 hours as needed for nausea    SODIUM CHLORIDE 3 % NEBULIZER SOLUTION    USE 1 VIAL VIA NEBULIZER FOUR TIMES DAILY AS NEEDED    SPACER/AERO-HOLDING CHAMBERS (AEROCHAMBER PLUS-FLOW SIGNAL) MISC    Use with inhaler as directed    SUCRALFATE (CARAFATE) 1 GM/10ML SUSPENSION    Take 10 mLs by mouth 4 times daily    TRIAMCINOLONE (NASACORT) 55 MCG/ACT NASAL INHALER    2 sprays by Nasal route daily   Modified Medications    No medications on file   Discontinued Medications    No medications on file      Medications taken on the morning of surgery: clonidine, ismo, klonopin, inhaler, zofirax, zyrtec     PHYSICAL EXAMINATION     Airway Physical    Mallampati: 2 HEENT: normal   Dentition: normal Neuro: appears well, normal, alert & oriented x 3   Mouth Opening: adequate CVS: regular rate & rhythm. No murmurs, clicks, or gallops. No. JVD. Prominent Incisors: no Respiratory:  CTAB, no wheezes, rales or rhonchi   Thyromental Distance: >3 cm Abdomen: soft, nontender, non-distended. No guarding or rebound. Normal bowel sounds.    Head & Neck Range of Motion: full Extremities: no peripheral edema       ANESTHETIC PLAN     ASA Status: 3 Anesthesia Type:

## 2018-03-02 NOTE — H&P
Prior to Admission medications    Medication Sig Start Date End Date Taking? Authorizing Provider   cloNIDine (CATAPRES) 0.1 MG tablet Take 1 tablet by mouth 2 times daily 2/13/18  Yes Deneice Osler, MD   isosorbide mononitrate (ISMO;MONOKET) 10 MG tablet TAKE THREE TABLETS TWICE A DAY 2/13/18  Yes Deneice Osler, MD   clonazePAM (KLONOPIN) 0.5 MG tablet Take 2-4 tablets by mouth 3 times daily as needed (anxiety / sleep) for up to 90 days. 2/13/18 5/14/18 Yes Deneice Osler, MD   fluticasone-vilanterol (BREO ELLIPTA) 100-25 MCG/INH AEPB inhaler Inhale 1 puff into the lungs daily 8/24/17  Yes Slim Winters MD   acyclovir (ZOVIRAX) 800 MG tablet Take 1 tablet by mouth 2 times daily 8/10/17  Yes Kiesha Sterling., DO   cetirizine (ZYRTEC) 10 MG tablet Take 10 mg by mouth daily  2/28/18  Yes Kiesha Sterling., DO   buPROPion [facility]) 100 MG tablet Take 2 tablets by mouth 2 times daily 2/13/18   Deneice Osler, MD   azithromycin [facility]) 500 MG tablet One tab Mon, Wed, Fri 2/13/18   Slim Winters MD   predniSONE (DELTASONE) 10 MG tablet 4 tabs daily x 5 days 1/4/18   Slim Winters MD   azithromycin (ZITHROMAX) 500 MG tablet Take 1 tablet daily for 3 days. 12/7/17   Slim Winters MD   mometasone (NASONEX) 50 MCG/ACT nasal spray 2 sprays by Nasal route 2 times daily MUST BE GENERIC SANDOZ!! 10/20/17   Slim Winters MD   sodium chloride 3 % nebulizer solution USE 1 VIAL VIA NEBULIZER FOUR TIMES DAILY AS NEEDED 8/29/17   Slim Winters MD   albuterol (PROVENTIL) (2.5 MG/3ML) 0.083% nebulizer solution Use every 4 hr as needed for chest congestion 8/24/17   Slim Winters MD   azithromycin (ZITHROMAX) 500 MG tablet Take 1 tablet daily for 5 days.  8/24/17   Slim Winters MD   prochlorperazine (COMPAZINE) 10 MG tablet Take 1 tablet by mouth every 6 hours as needed (nausea) 8/10/17   Kiesha Webb DO   omega-3 acid ethyl esters (LOVAZA) 1 g capsule Take 2 capsules by 1 tab daily 7/1/16   Nessa Coy MD   fluocinonide (LIDEX) 0.05 % cream APPLY TOPICALLY TWICE DAILY 5/13/15   Rosalba Chavez MD   esomeprazole (NEXIUM) 40 MG capsule Take 1 capsule twice a day 4/9/15   Rosalba Chavez MD   promethazine (PHENERGAN) 12.5 MG tablet Take 1 tablet every 6 hours as needed for nausea 2/1/15   Erick Delvalle MD   mometasone (ELOCON) 0.1 % cream APPLY TOPICALLY DAILY 12/17/14   Rosalba Chavez MD   loperamide (IMODIUM) 2 MG capsule Take 1 capsule by mouth 4 times daily as needed for Diarrhea. 11/25/14   Raz Moore,    modafinil (PROVIGIL) 200 MG tablet Take 100-200 mg by mouth daily. Senthil Menjivar MD         Allergies:  Maulik Notice hcl]; Ciprofloxacin; Cardizem [diltiazem hcl]; Levaquin [levofloxacin in d5w]; Levaquin [levofloxacin]; Lisinopril; Nsaids; Propranolol; Benadryl [diphenhydramine]; Clindamycin/lincomycin; and Pcn [penicillins]    PHYSICAL EXAM:      /65   Pulse 89   Temp 98.3 °F (36.8 °C) (Oral)   Resp 16   Ht 5' 10\" (1.778 m)   Wt 160 lb (72.6 kg)   SpO2 97%   BMI 22.96 kg/m²      Heart:  regular rate and rhythm,no murmur     Lungs:  No increased work of breathing, good air exchange, clear to auscultation bilaterally, no crackles or wheezing    Abdomen:  soft, non-distended, non-tender, no rebound tenderness or guarding, normal active bowel sounds and no masses palpated    ASSESSMENT AND PLAN:    1. Patient seen and focused exam done today- no new changes since last physical exam on 02/27/18    2. Access to ancillary services are available per request of the provider.     Fide Patrick CNP     3/2/2018

## 2018-03-03 NOTE — PROGRESS NOTES
Brought pt's clothes so his wife could start helping pt get dressed. Pt refusing to get dressed to go home. States he is not ready to go home but unable to state feeling or problem as to why. Pt then stated \"I feel woozie\"  Pt placed back in stretcher. Demanded his bipap/vpap be placed back on face. It was done.

## 2018-03-03 NOTE — OP NOTE
and AP C-arm fluoroscope was brought  into position to best visualize the T12 level. At this point, the back was  prepped and draped in usual sterile fashion and a time-out procedure was  performed and two 20-gauge spinal needles were walked up the spine to  identify the level that corresponded to T12 by counting from the  lumbosacral junction with the assumption of five lumbar vertebral bodies. However, on AP fluoroscopic imaging, it actually appears that the patient  had 6 lumbar bearing vertebra as the first rib level was actually the level  above the vertebral body with lesion. Therefore, to keep consistent, the  counting was based off of the MRI scan, but not the lowest vertebral body  bearing ribs. There also appeared to be a lesion within the vertebral body  that corresponded on the MRI as was seen with lateral fluoroscopic imaging. At this point, two 20-gauge spinal needles were placed in the upper outer  aspects of the T12 vertebral body being consistent with the MRI and a  transpedicular approach was performed with the Jamshidi needle. This was  done by first injecting the soft tissues with 50:50 mixture of 1% lidocaine  with epinephrine mixed with Marcaine and subsequently making a 3-mm  incision just lateral to the spinal needle and then placing the Express  Jamshidi needle combination through a transpedicular approach into the  posterior aspect of the vertebral body. On the right side, where there was  a known lesion as the cannula approached the anterior aspect of the pedicle  as it junction with the vertebral body, the Jamshidi needle was removed and  the biopsy probe was placed through the Express cannula and a suction  syringe was placed on to the biopsy needle as the needle was tamped across  the remaining pedicle and into and through the posterior aspect of  vertebral body on the right side. This was felt to provide the best  approach for capturing the lesion.   Both a small bone core as well as soft  tissue presumably from the tumor as well as from bone marrow was also  captured and these were sent as two separate specimens to pathology. The  bone core was sent in formalin and as requested by the lab, the soft  tissue/marrow was sent fresh. At this point, the Express cannula was  advanced into the posterior aspect of vertebral body on the right side and  on the left side and with the cannula now in place, a drill as used to  drill through the express cannula into the vertebral bodies to create a  channel for the inflatable balloon tamps and these were then inserted in  the vertebral body and a gradual progressive inflation was performed up to  about 2 to 2-1/2 mL of volume, 15-mm balloon tamps were used. This gave  excellent inflation and subsequently the balloon tamps were collapsed and  removed and a thickened putty-like consistency of the Xpede cement was  injected into the vertebral body first on the left side and then  subsequently on the right side, controlling the opposite side with a fully  styletted cement cannula. There was excellent filling of the vertebral  body with cement and subsequently, the cement delivery system was removed  and once the cement hardened, the fully styletted cement cannulas were  removed and a 4-0 Vicryl suture was placed in the subcuticular layer of the  skin above the incision site. Mastisol and Steri-Strips as well as the  sterile dressing and OpSite were placed over each of the incisions as were  the sterile dressing and OpSite. All needle and sponge counts were noted  to be correct at the end of the procedure. Final x-rays were sent to the  PACS. There was no radiologist present during the case and all of the  imaging from the fluoroscopes was independently reviewed by the surgeon and  decisions were made on that bases, based on the images during cannulation  of the pedicles, inflation of the balloons, and subsequently the placement  of cement.   In accordance with CMS regulations, I was present throughout  the entire case.         Laura Dumont MD    D: 03/02/2018 20:30:06       T: 03/03/2018 0:16:34     [email or web address]  Job#: [id number]     Doc#: [id number]    CC:

## 2018-03-03 NOTE — PROGRESS NOTES
Ambulated to bathroom. Stayed with patient even though he greatly protested. Able to void large unmeasured amount of urine.   Nausea resolving

## 2018-03-07 NOTE — ANESTHESIA POST-OP
Anesthesia Post-op Note  LATE ENTRY-PATIENT SEEN DOS      Patient Name: Cecilia Cook YOB: 1955   Sex:  Male Age: 58 yrs      Medical Record Number: [id number] Acct Number: [de-identified]      Date of Procedure: 3/2/2018 Time of Procedure: 1430      Preoperative Diagnosis: M84.58XA PATHOLOGICAL FRACTURE IN   Procedure: T12 KYPHOPLASTY WITH BIOPSY   Surgeon: Juan Antonio Russell MD     Anesthesia type: general    Post-op assessment:  Anesthetic Problems: no   Last Vitals:     BP: 118/73   Pulse: 88   Resp: 20   Temp: 98 °F (36.7 °C)   SpO2: 96%     Cardiovascular System Stable: yes  Respiratory Function: Airway Patent yes  ETT no  Ventilator no  Level of consciousness: awake, alert and oriented  Post-op pain: adequate analgesia  Hydration Adequate: yes  Nausea/Vomiting:no  Other Issues:     Jamil Vaca MD

## 2018-03-29 ENCOUNTER — OFFICE VISIT (OUTPATIENT)
Dept: PULMONOLOGY | Age: 63
End: 2018-03-29

## 2018-03-29 VITALS
HEIGHT: 70 IN | BODY MASS INDEX: 23.77 KG/M2 | OXYGEN SATURATION: 97 % | WEIGHT: 166 LBS | RESPIRATION RATE: 16 BRPM | SYSTOLIC BLOOD PRESSURE: 134 MMHG | HEART RATE: 113 BPM | DIASTOLIC BLOOD PRESSURE: 82 MMHG

## 2018-03-29 DIAGNOSIS — C90.02 MULTIPLE MYELOMA IN RELAPSE (HCC): ICD-10-CM

## 2018-03-29 DIAGNOSIS — J21.9 BRONCHIOLITIS: Primary | ICD-10-CM

## 2018-03-29 PROCEDURE — 99214 OFFICE O/P EST MOD 30 MIN: CPT | Performed by: INTERNAL MEDICINE

## 2018-03-29 RX ORDER — FLUTICASONE FUROATE AND VILANTEROL 100; 25 UG/1; UG/1
1 POWDER RESPIRATORY (INHALATION) DAILY
Qty: 3 EACH | Refills: 3 | Status: ON HOLD | OUTPATIENT
Start: 2018-03-29 | End: 2018-01-01 | Stop reason: HOSPADM

## 2018-03-29 RX ORDER — MOMETASONE FUROATE 50 UG/1
2 SPRAY, METERED NASAL 2 TIMES DAILY
Qty: 3 INHALER | Refills: 3 | Status: SHIPPED | OUTPATIENT
Start: 2018-03-29

## 2018-03-29 RX ORDER — POMALIDOMIDE 4 MG/1
CAPSULE ORAL
Status: ON HOLD | COMMUNITY
Start: 2018-03-16 | End: 2018-01-01 | Stop reason: HOSPADM

## 2018-03-29 RX ORDER — ALBUTEROL SULFATE 2.5 MG/3ML
SOLUTION RESPIRATORY (INHALATION)
Qty: 120 EACH | Refills: 3 | Status: ON HOLD | OUTPATIENT
Start: 2018-03-29 | End: 2018-01-01 | Stop reason: HOSPADM

## 2018-04-20 ENCOUNTER — OFFICE VISIT (OUTPATIENT)
Dept: ENT CLINIC | Age: 63
End: 2018-04-20

## 2018-04-20 VITALS
HEART RATE: 98 BPM | TEMPERATURE: 98.4 F | WEIGHT: 158 LBS | HEIGHT: 70 IN | SYSTOLIC BLOOD PRESSURE: 146 MMHG | DIASTOLIC BLOOD PRESSURE: 89 MMHG | BODY MASS INDEX: 22.62 KG/M2

## 2018-04-20 DIAGNOSIS — M77.9 STYLOHYOID TENDONITIS: ICD-10-CM

## 2018-04-20 DIAGNOSIS — R05.9 COUGH: ICD-10-CM

## 2018-04-20 DIAGNOSIS — H92.02 REFERRED OTALGIA OF LEFT EAR: ICD-10-CM

## 2018-04-20 DIAGNOSIS — R07.0 THROAT PAIN: ICD-10-CM

## 2018-04-20 PROCEDURE — 99213 OFFICE O/P EST LOW 20 MIN: CPT | Performed by: OTOLARYNGOLOGY

## 2018-04-20 RX ORDER — NAPROXEN 500 MG/1
500 TABLET ORAL 2 TIMES DAILY WITH MEALS
Qty: 60 TABLET | Refills: 2 | Status: ON HOLD | OUTPATIENT
Start: 2018-04-20 | End: 2018-01-01 | Stop reason: HOSPADM

## 2018-04-23 ENCOUNTER — TELEPHONE (OUTPATIENT)
Dept: ENT CLINIC | Age: 63
End: 2018-04-23

## 2018-05-24 ENCOUNTER — OFFICE VISIT (OUTPATIENT)
Dept: PULMONOLOGY | Age: 63
End: 2018-05-24

## 2018-05-24 VITALS
SYSTOLIC BLOOD PRESSURE: 122 MMHG | HEIGHT: 70 IN | DIASTOLIC BLOOD PRESSURE: 70 MMHG | OXYGEN SATURATION: 99 % | BODY MASS INDEX: 23.34 KG/M2 | RESPIRATION RATE: 20 BRPM | HEART RATE: 92 BPM | WEIGHT: 163 LBS

## 2018-05-24 DIAGNOSIS — G47.33 OSA (OBSTRUCTIVE SLEEP APNEA): Chronic | ICD-10-CM

## 2018-05-24 DIAGNOSIS — K21.00 GASTROESOPHAGEAL REFLUX DISEASE WITH ESOPHAGITIS: Chronic | ICD-10-CM

## 2018-05-24 DIAGNOSIS — J21.9 BRONCHIOLITIS: Primary | ICD-10-CM

## 2018-05-24 DIAGNOSIS — C90.02 MULTIPLE MYELOMA IN RELAPSE (HCC): ICD-10-CM

## 2018-05-24 PROCEDURE — 99214 OFFICE O/P EST MOD 30 MIN: CPT | Performed by: INTERNAL MEDICINE

## 2018-05-24 RX ORDER — OMEPRAZOLE 40 MG/1
CAPSULE, DELAYED RELEASE ORAL
Qty: 60 CAPSULE | Refills: 3 | Status: ON HOLD | OUTPATIENT
Start: 2018-05-24 | End: 2018-01-01 | Stop reason: HOSPADM

## 2018-05-31 ENCOUNTER — TELEPHONE (OUTPATIENT)
Dept: PULMONOLOGY | Age: 63
End: 2018-05-31

## 2018-06-01 ENCOUNTER — TELEPHONE (OUTPATIENT)
Dept: FAMILY MEDICINE CLINIC | Age: 63
End: 2018-06-01

## 2018-06-04 ENCOUNTER — TELEPHONE (OUTPATIENT)
Dept: ENT CLINIC | Age: 63
End: 2018-06-04

## 2018-06-06 ENCOUNTER — TELEPHONE (OUTPATIENT)
Dept: ENT CLINIC | Age: 63
End: 2018-06-06

## 2018-06-28 ENCOUNTER — OFFICE VISIT (OUTPATIENT)
Dept: PULMONOLOGY | Age: 63
End: 2018-06-28

## 2018-06-28 VITALS
RESPIRATION RATE: 16 BRPM | DIASTOLIC BLOOD PRESSURE: 70 MMHG | HEART RATE: 107 BPM | HEIGHT: 70 IN | SYSTOLIC BLOOD PRESSURE: 128 MMHG | BODY MASS INDEX: 22.76 KG/M2 | OXYGEN SATURATION: 98 % | WEIGHT: 159 LBS

## 2018-06-28 DIAGNOSIS — C90.02 MULTIPLE MYELOMA IN RELAPSE (HCC): ICD-10-CM

## 2018-06-28 DIAGNOSIS — G47.33 OSA (OBSTRUCTIVE SLEEP APNEA): Chronic | ICD-10-CM

## 2018-06-28 DIAGNOSIS — R05.9 COUGH: Primary | ICD-10-CM

## 2018-06-28 DIAGNOSIS — J21.9 BRONCHIOLITIS: ICD-10-CM

## 2018-06-28 PROCEDURE — 99214 OFFICE O/P EST MOD 30 MIN: CPT | Performed by: INTERNAL MEDICINE

## 2018-07-02 NOTE — PROGRESS NOTES
FOUR TIMES DAILY AS NEEDED, Disp: 480 mL, Rfl: 11    azithromycin (ZITHROMAX) 500 MG tablet, Take 1 tablet daily for 5 days. , Disp: 5 tablet, Rfl: 0    acyclovir (ZOVIRAX) 800 MG tablet, Take 1 tablet by mouth 2 times daily, Disp: 180 tablet, Rfl: 0    prochlorperazine (COMPAZINE) 10 MG tablet, Take 1 tablet by mouth every 6 hours as needed (nausea), Disp: 40 tablet, Rfl: 1    omega-3 acid ethyl esters (LOVAZA) 1 g capsule, Take 2 capsules by mouth 2 times daily, Disp: 360 capsule, Rfl: 1    Pamidronate Disodium (AREDIA IV), Infuse intravenously, Disp: , Rfl:     Calcium Carb-Cholecalciferol (CALCIUM + D3) 600-200 MG-UNIT TABS tablet, Take 1 tablet by mouth Daily with lunch, Disp: , Rfl:     folic acid (FOLVITE) 913 MCG tablet, Take 400 mcg by mouth daily, Disp: , Rfl:     Cyanocobalamin (B-12) 2500 MCG TABS, Take by mouth, Disp: , Rfl:     guaiFENesin 400 MG tablet, Take 400 mg by mouth 4 times daily as needed for Cough, Disp: , Rfl:     Doxepin HCl 3 MG TABS, Take by mouth, Disp: , Rfl:     oxyCODONE (ROXICODONE) 5 MG immediate release tablet, Take one-half a tablet every 4-6 hours as needed for pain., Disp: 75 tablet, Rfl: 0    predniSONE (DELTASONE) 10 MG tablet, 4 tabs daily x 1 week, 3 tabs x 1 week, 2 tabs daily, Disp: 100 tablet, Rfl: 3    Spacer/Aero-Holding Chambers (AEROCHAMBER PLUS-FLOW SIGNAL) MISC, Use with inhaler as directed, Disp: 1 each, Rfl: 3    triamcinolone (NASACORT) 55 MCG/ACT nasal inhaler, 2 sprays by Nasal route daily, Disp: 1 Inhaler, Rfl: 3    dexamethasone (DECADRON) 4 MG tablet, Take as directed per research, Disp: 20 tablet, Rfl: 1    sucralfate (CARAFATE) 1 GM/10ML suspension, Take 10 mLs by mouth 4 times daily, Disp: 500 mL, Rfl: 3    lidocaine (LIDODERM) 5 %, Place 1 patch onto the skin daily 12 hours on, 12 hours off., Disp: 30 patch, Rfl: 0    diphenhydrAMINE-APAP (PERCOGESIC) 12.5-325 MG TABS, Take by mouth as needed (Pain), Disp: , Rfl:     pimecrolimus

## 2018-09-13 NOTE — TELEPHONE ENCOUNTER
Patient stopped in office to sign medical release papers for Dr Rajiv Lara and [facility] on 9/11/18. All records were faxed to Dr Rajiv Lara. Records to [facility] [place] would not go through: BUSY, COMMUNICATION ERROR OR N/A. I lm on pt's vm that if he wanted he could stop in office because I was unable to fax records.

## 2018-09-25 NOTE — PLAN OF CARE
Problem: Falls - Risk of:  Goal: Will remain free from falls  Will remain free from falls  Outcome: Ongoing  Pt is a Low fall risk. Explained fall risk precautions to pt and rationale behind their use to keep the patient safe. Belongings are in reach. Pt encouraged to notify staff for any and all assistance. Staff present in tx suite throughout entirety of pts treatment to monitor and protect from falls. Assistance provided when ambulating to restroom utilizing Stay With Me. Problem: Bleeding:  Goal: Will show no signs and symptoms of excessive bleeding  Will show no signs and symptoms of excessive bleeding  Outcome: Ongoing  Patient's hemoglobin this AM: 13.2  Patient's platelet count this AM: 34  Pt seen and assessed at [facility]. Seen at [facility] today for 1 unit Platelet transfusion per standing orders for above lab values per [facility]. Blood product transfused per [facility] policy. Pt tolerated transfusion well and without incident. Pt verbalizes understanding of discharge instructions. Discharged ambulatory to appointment at [facility] per self.

## 2018-10-03 NOTE — PROGRESS NOTES
Patient seen and assessed at [facility]. Here for platelets for count of 15. PIV in place, brisk blood return.

## 2018-10-05 NOTE — PROGRESS NOTES
Patient's hemoglobin this AM: 11.0 g/dL. Patient's platelet count this AM: 18.0 k/uL. Pt seen and assessed at [facility]. Seen at [address] [facility] today for platelet transfusion per standing orders for above lab values. Blood products transfused per [facility] policy. Pt tolerated transfusion well. Blood pressure did elevate slightly during infusion (see flowsheet). Patient reported feeling fine and was asymptomatic. Dr. Phan Bullock notified. Patient okayed to be discharged. Patient was educated to monitor blood pressures at home, and report to the ED and/or page [facility] if symptoms of headache, blurred vision, or ringing in the ears occurs. Patient verbalized understanding. Discharged ambulatory to home.

## 2018-10-11 NOTE — PLAN OF CARE
Problem: Bleeding:  Goal: Will show no signs and symptoms of excessive bleeding  Will show no signs and symptoms of excessive bleeding   Outcome: Ongoing  Patient's hemoglobin this AM: 8.8  Patient's platelet count this UN:7.9   [facility] Seen at [address] [place] [address] today for 1 unit of SDP per MD orders for above lab values. Blood products transfused per [facility] policy. Pt tolerated transfusion well and without incident. No S/S of bleeding noted or reported by patient, bleeding precautions reviewed with pt Pt verbalizes understanding of discharge instructions. Discharged ambulatory to home.

## 2018-10-19 NOTE — PLAN OF CARE
Problem: Bleeding:  Goal: Will show no signs and symptoms of excessive bleeding  Will show no signs and symptoms of excessive bleeding   Outcome: Ongoing  Patient's hemoglobin this AM: 8.8  Patient's platelet count this AM: 8  Pt seen and assessed at [facility]. Seen at [address] [place] [address] today for 1 unit Platelet transfusion per standing orders for above lab values. Blood products transfused per [facility] policy. Pt tolerated transfusion well and without incident. Pt verbalizes understanding of discharge instructions. Discharged ambulatory to home per self.

## 2018-10-22 NOTE — PROGRESS NOTES
Patient's hemoglobin this AM: 7.1 g/dL. Patient's platelet count this AM: 31.0 k/uL. Pt seen and assessed at [facility]. Seen at [address] [place] [address] today for platelet transfusion per standing orders for above lab values. Blood products transfused per [facility] policy. Pt tolerated transfusion well and without incident. Pt verbalizes understanding of discharge instructions. Discharged ambulatory to home.

## 2018-10-26 PROBLEM — J18.9 PNEUMONIA: Status: ACTIVE | Noted: 2018-01-01

## 2018-10-26 NOTE — ONCOLOGY
I agree as the admission nurse that 2 RN's have performed a thorough Head to Toe Skin Assessment on the patient. ALL assessment sites listed below have been assessed on admission.        Areas assessed by both nurses: Kelley Alex   [x]   Head, Face, and Ears   [x]   Shoulders, Back, and Chest  [x]   Arms, Elbows, and Hands   [x]   Coccyx, Sacrum, and Ischum  [x]   Legs, Feet, and Heels        Does the Patient have Skin Breakdown? no         Nurse 1 eSignature:Aparna Jung RN     Nurse 2 eSignature: Electronically signed by Jose Purcell RN on 10/26/18 at 6:11 PM

## 2018-10-26 NOTE — H&P
marrow biopsy and aspirate (9/8/14) showed residual kappa clonal plasma cells, but < 1%. FISH continued to show monosomy 13; deletion 17p. SPEP showed no restricted bands and SIFE was faintly positive, minor restriction. THe [place] FLC were 335 and Urine FLC were 1040. UPEP was 0.28 gm per day (Initially 2.1 gm per day). He was then admitted on 11/10/14 for high dose melphalan preparative regimen followed by autologous stem cell rescue on 11/12/18 (total dose of 4.3 x 10^6 CD34+/Kg). His day 100 restaging demonstrated residual kappa light chains in the blood and urine, but continued with VGPR1. He was then started on Revilimid maintenance, which was held on 5/15/18 due to pancytopenia. He then restarted Revilimid 5mg 21/28 days on 10/30/15 which then had to be stopped 12/18/15 due to intolerance. He then had this first relapse December 2016 and was started on research protocol with Daratumumab + CyBorD on 12/29/16. He also went on to receive vertebroplasty and XRT to T6-8 (1/12/17-1/26/17). After completion of 4 cycles of Daratumumab+CyBorD he was in a CR and was started on Daratumumab maintenance. Then in March of 2018 his urine light chains elevated > 400, which confirmed his second relapse. During his second relapse, Dr. Cate Brock discussed a triplet vs doublet therapy and he agreed to start Pomalidomide/Dexamethasone on 3/26/18. He temporarily stopped DELLA in May due to side effects and resumed with dose reduction in July. This was eventually stopped due to intolerance in August of 2018. He had repeat MRI of the spine, which showed concerning lesions at C6, T4 and L5 and underwent radiation per Dr. Isrrael Jacques (8/28/18 - 9/12/18). He was then started on the [place] protocol 9/17/18 (Melphalon, Kyprolis and Prednisone). He was also seen at the [facility] by Pulmonology, Speech Patholgy, MedOnc and Palliative Care.  He continued to be extremely weak and short of breath and require

## 2018-10-28 NOTE — PLAN OF CARE
Problem: Falls - Risk of:  Goal: Absence of physical injury  Absence of physical injury   Outcome: Met This Shift  Bed alarm active, call bell w/in reach. Problem: Risk for Impaired Skin Integrity  Goal: Tissue integrity - skin and mucous membranes  Structural intactness and normal physiological function of skin and  mucous membranes. Outcome: Met This Shift  Skin remains intact. Problem: Bleeding:  Goal: Will show no signs and symptoms of excessive bleeding  Will show no signs and symptoms of excessive bleeding  Outcome: Met This Shift  Pt admitted from the office, hemoglobin 6.8 in [facility]. Awaiting lab results and type/screen to administer prbc.
Problem: Falls - Risk of:  Goal: Will remain free from falls  Will remain free from falls   Outcome: Ongoing  High Fall Risk per GRIJALVA/ABCDS: Explained fall risk precautions to pt and family and rationale behind their use to keep the patient safe. Pt bed is in low position, side rails up, call light and belongings are in reach. Fall wristband applied and present on pts wrist.  Bed alarm on. Pt encouraged to call for assistance. Will continue with hourly rounds for PO intake, pain needs, toileting and repositioning as needed. Problem: Risk for Impaired Skin Integrity  Goal: Tissue integrity - skin and mucous membranes  Structural intactness and normal physiological function of skin and  mucous membranes. Outcome: Ongoing  No signs of skin breakdown at this time. Problem: Bleeding:  Goal: Will show no signs and symptoms of excessive bleeding  Will show no signs and symptoms of excessive bleeding   Outcome: Ongoing  Patient's hemoglobin this AM:   Recent Labs      10/27/18   0727   HGB  6.7*     Patient's platelet count this AM:   Recent Labs      10/27/18   0727   PLT  30*    Thrombocytopenia Precautions in place. Patient showing no signs or symptoms of active bleeding. Transfusion not indicated at this time. Patient verbalizes understanding of all instructions. Will continue to assess and implement POC. Call light within reach and hourly rounding in place. Problem: Pain:  Goal: Pain level will decrease  Pain level will decrease   Outcome: Ongoing  Pt has not complained of any pain during this shift.
POC. Call light within reach and hourly rounding in place.

## 2018-10-28 NOTE — PROGRESS NOTES
[employer] Progress Note    10/28/2018     Mis Patel    MRN: [id number]    : 1955    Referring MD: Renny Miller, DO  [address]  [place], [address]    SUBJECTIVE:  Patient is doing welI. He was ambulating this am with minimal assistance. ECOG PS:  (2) Ambulatory and capable of self care, unable to carry out work activity, up and about > 50% or waking hours    Isolation: None    Medications    Scheduled Meds:   meropenem  1 g Intravenous Q12H    [START ON 10/29/2018] acyclovir  800 mg Oral Daily    Saline Mouthwash  15 mL Swish & Spit 4x Daily AC & HS    sodium chloride flush  10 mL Intravenous 2 times per day    calcium-vitamin D  1 tablet Oral Lunch    cetirizine  10 mg Oral Daily    clonazePAM  1 mg Oral Nightly    cloNIDine  0.1 mg Oral BID    folic acid  185 mcg Oral Daily    isosorbide mononitrate  30 mg Oral Daily    modafinil  100 mg Oral Daily    fluticasone  2 spray Each Nare Daily    ondansetron  4 mg Oral Q6H    gabapentin  600 mg Oral BID    gabapentin  200 mg Oral Daily    gabapentin  300 mg Oral Daily     Continuous Infusions:   sodium chloride      sodium chloride 75 mL/hr at 10/28/18 0128     PRN Meds:.sodium chloride, alteplase, magnesium hydroxide, magnesium sulfate, Saline Mouthwash, sodium chloride flush, acetaminophen, loperamide, oxyCODONE, prochlorperazine, potassium chloride, [DISCONTINUED] budesonide **AND** albuterol    ROS  · Constitutional: Denies fever  · Eyes: No visual changes or diplopia. No scleral icterus. · ENT: No Headaches, hearing loss or vertigo. No mouth sores or sore throat. · Cardiovascular: No chest pain, dyspnea on exertion, palpitations or loss of consciousness. · Respiratory:  no sputum production. No hemoptysis. · Gastrointestinal: No abdominal pain, appetite loss, blood in stools. No change in bowel habits. · Genitourinary: No dysuria, trouble voiding, or hematuria.   · Musculoskeletal: No joint
Nutrition Assessment    Type and Reason for Visit: Initial, Positive Nutrition Screen    Nutrition Recommendations:     1. General diet, monitor and encourage intake  2. Add Ensure Enlive BID to aid in meeting increased nutrient needs  3. Monitor nutritional adequacy and clinical progress    Malnutrition Assessment:  · Malnutrition Status: Insufficient data  · Context: Chronic illness  · Findings of the 6 clinical characteristics of malnutrition (Minimum of 2 out of 6 clinical characteristics is required to make the diagnosis of moderate or severe Protein Calorie Malnutrition based on AND/ASPEN Guidelines):  1. Energy Intake-Not available, not able to assess    2. Weight Loss-No significant weight loss,    3. Fat Loss-Unable to assess (pt unavailable),    4. Muscle Loss-Unable to assess,    5. Fluid Accumulation-No significant fluid accumulation,    6.  Strength-Not measured    Nutrition Diagnosis:   · Problem: Increased nutrient needs  · Etiology: related to Catabolic illness     Signs and symptoms:  as evidenced by  (Chemo, MM)    Nutrition Assessment:  · Subjective Assessment: Positive screen for wt loss and poor po. Patient is a direct admit from [facility] r/t weakness and pnuemonia, dx of multiple myeloma. Pt hx of fibromyalgia, HTN, CKD, who initially presented with several weeks of worsening back pain as well as progressive kidney injury. Patient resting this am, did not disturb. No MD H/P noted in EMR as of yet. General diet ordered, per EMR patient has had no significant wt loss ~1 year. Will add ONS.    · Nutrition-Focused Physical Findings: No BM recorded  · Wound Type: None  · Current Nutrition Therapies:  · Oral Diet Orders: General   · Oral Diet intake: 1-25%  · Oral Nutrition Supplement (ONS) Orders: None  · Anthropometric Measures:  · Ht: 5' 10\" (177.8 cm)   · Current Body Wt: 163 lb (73.9 kg)  · Usual Body Wt:  (158-163#)  · Ideal Body Wt: 166 lb (75.3 kg),   · BMI Classification: BMI 18.5 - 24.9
Pt declined albuterol and pulmicort breathing txs. Pt states he does not want to receive scheduled breathing txs and that he will call if he needs a prn albuterol tx.
headache, diplopia. No change in gait, balance, or coordination. No paresthesias. · Endocrine: No temperature intolerance. No excessive thirst, fluid intake, or urination. · Hematologic/Lymphatic: No abnormal bruising or ecchymoses, blood clots or swollen lymph nodes. · Allergic/Immunologic: No nasal congestion or hives. Physical Exam:     I&O:    Intake/Output Summary (Last 24 hours) at 10/27/18 0924  Last data filed at 10/27/18 0700   Gross per 24 hour   Intake             1140 ml   Output             2500 ml   Net            -1360 ml       Vital Signs:  /61   Pulse 95   Temp 98.3 °F (36.8 °C) (Oral)   Resp 15   Ht 5' 10\" (1.778 m)   Wt 163 lb (73.9 kg)   SpO2 96%   BMI 23.39 kg/m²     Weight:    Wt Readings from Last 3 Encounters:   10/27/18 163 lb (73.9 kg)   07/02/18 157 lb 6.4 oz (71.4 kg)   06/28/18 159 lb (72.1 kg)         General: Awake, alert and oriented.   HEENT: normocephalic, PERRL, no scleral erythema or icterus, Oral mucosa moist and intact, throat clear  NECK: supple without palpable adenopathy  BACK: Straight negative CVAT  SKIN: warm dry and intact without lesions rashes or masses  CHEST: CTA bilaterally without use of accessory muscles  CV: Normal S1 S2, RRR, no MRG  ABD: NT ND normoactive BS, no palpable masses or hepatosplenomegaly  EXTREMITIES: without edema, denies calf tenderness  NEURO: CN II - XII grossly intact      Data    CBC: Recent Labs      10/26/18   1651  10/27/18   0727   WBC  2.3*  1.7*   HGB  7.1*  6.7*   HCT  20.6*  19.8*   MCV  95.5  96.6   PLT  24*  30*     BMP/Mag:  Recent Labs      10/26/18   1651  10/27/18   0727   NA  143  142   K  3.9  3.8   CL  108  110   CO2  21  21   PHOS  2.9   --    BUN  18  16   CREATININE  1.4*  1.5*   MG  2.00   --      LIVP:   Recent Labs      10/26/18   1651   AST  17   ALT  18   BILIDIR  <0.2   BILITOT  0.6   ALKPHOS  85     Coags:   Recent Labs      10/26/18   1651   PROTIME  13.9*   INR  1.22*   APTT  39.0*     Uric Acid

## 2018-10-29 NOTE — DISCHARGE SUMMARY
[facility] Discharge Summary             Attending Physician: Sawyer Salgado DO    Referring MD: Sawyer Salgado DO  [address]  [address]  [facility] [address]    Name: Jong Novoa :  1955  MRN:  [id number]    Admission: 10/26/2018   Discharge:   10/29/18    Date: 10/29/2018    Diagnosis on admit: RUL Pneumonia     Procedures: Routine chest x-ray, laboratories, EKG, IV fluid hydration, Panculture for fevers, IV antimicrobial therapy, Respiratory therapy, Oxygen therapy        Medications:    Ruddy Johnson 7851 Medication Instructions [facility]:[id number]    Printed on:10/29/18 1014   Medication Information                      acetaminophen (TYLENOL) 325 MG tablet  Take 650 mg by mouth every 6 hours as needed for Pain             acyclovir (ZOVIRAX) 800 MG tablet  Take 1 tablet by mouth daily             albuterol (PROVENTIL) (2.5 MG/3ML) 0.083% nebulizer solution  Take 3 mLs by nebulization every 4 hours as needed for Wheezing             buPROPion (WELLBUTRIN) 100 MG tablet  Take 2 tablets by mouth 2 times daily             Calcium Carb-Cholecalciferol (CALCIUM + D3) 600-200 MG-UNIT TABS tablet  Take 1 tablet by mouth Daily with lunch             cetirizine (ZYRTEC) 10 MG tablet  Take 10 mg by mouth daily              clonazePAM (KLONOPIN) 0.5 MG tablet  Take 2-4 tablets by mouth nightly as needed (anxiety / sleep) for up to 90 days. .             cloNIDine (CATAPRES) 0.1 MG tablet  Take 1 tablet by mouth 2 times daily             Cyanocobalamin (B-12) 2500 MCG TABS  Take by mouth             diphenhydrAMINE-APAP (PERCOGESIC) 12.5-325 MG TABS  Take by mouth as needed (Pain)             ertapenem (INVANZ) infusion  Infuse 1,000 mg intravenously every 24 hours for 9 days Compound per protocol. fluocinonide (LIDEX) 0.05 % cream  Apply 1 applicator topically 2 times daily as needed (rash) Apply topically 2 times daily.              fluticasone-salmeterol ([facility]) 899-58 MCG/ACT inhaler  Inhale 2 puffs into the lungs 2 times daily             folic acid (FOLVITE) 938 MCG tablet  Take 400 mcg by mouth daily             gabapentin (NEURONTIN) 100 MG capsule  Take 2 capsules by mouth daily for 28 days. Miriam Loyda gabapentin (NEURONTIN) 300 MG capsule  Take 2 capsules by mouth 2 times daily for 28 days. Miriam Loyda gabapentin (NEURONTIN) 300 MG capsule  Take 1 capsule by mouth daily for 28 days. .             isosorbide mononitrate (ISMO;MONOKET) 10 MG tablet  TAKE THREE TABLETS TWICE A DAY             lansoprazole (PREVACID 24HR) 15 MG delayed release capsule  Take 15 mg by mouth daily             lidocaine (LIDODERM) 5 %  Place 1 patch onto the skin daily as needed for Pain 12 hours on, 12 hours off.             loperamide (IMODIUM) 2 MG capsule  Take 1 capsule by mouth 4 times daily as needed for Diarrhea.             modafinil (PROVIGIL) 200 MG tablet  Take 100 mg by mouth daily. .             mometasone (ELOCON) 0.1 % cream  APPLY TOPICALLY DAILY             mometasone (NASONEX) 50 MCG/ACT nasal spray  2 sprays by Nasal route 2 times daily MUST BE GENERIC SANDOZ! ! ondansetron (ZOFRAN) 4 MG tablet  Take 4 mg by mouth every 6 hours             oxyCODONE (ROXICODONE) 5 MG immediate release tablet  Take one-half a tablet every 4-6 hours as needed for pain.              pimecrolimus (ELIDEL) 1 % cream  Apply topically 2 times daily prn             prochlorperazine (COMPAZINE) 10 MG tablet  Take 1 tablet by mouth every 6 hours as needed (nausea)             sodium chloride 3 % nebulizer solution  USE 1 VIAL VIA NEBULIZER FOUR TIMES DAILY AS NEEDED             Spacer/Aero-Holding Chambers (AEROCHAMBER PLUS-FLOW SIGNAL) MISC  Use with inhaler as directed                 Reason for Admission: Pneumonia, fever in immunocompromised patient       Past Medical History:  Mr Yoana Beltran is a 61year old white male with hx of fibromyalgia, HTN, CKD, and relapsed multiple myeloma following Aaron, CNP

## 2018-11-15 NOTE — PROGRESS NOTES
Subjective:      Patient ID: Aurora Montaño is a 61 y.o. male. HPI   Mr. Lambert Albarran is on active chemotherapy again for relapsing multiple myeloma. He has had bone lesions that required radiation therapy because of pain. He was hospitalized with pneumonia 2-1/2 weeks ago, associated with neutropenia. He completed a course of IV meropenem 1 week ago. His cough has been under better control. He continues on gabapentin and on ICS/LABA. He was seen at the [facility] clinic, and exhaled nitric oxide was elevated. This prompted them to increase his dose of ICS. His major complaint is shortness of breath. This has been problematic for the past month or more. It is associated with his return to treatment for myeloma. He has been confined, unable to go swimming because of neutropenia. His exercise tolerance has been very poor and he has deconditioning. He also has anemia and does not improve well-being with RBC transfusion. Review of Systems    Objective:   Physical Exam   Constitutional: He is oriented to person, place, and time. He appears well-developed and well-nourished. HENT:   Head: Normocephalic and atraumatic. Mouth/Throat: Oropharynx is clear and moist. No oropharyngeal exudate. Eyes: Conjunctivae are normal. Right eye exhibits no discharge. No scleral icterus. Neck: No tracheal deviation present. No thyromegaly present. Cardiovascular: Normal rate, regular rhythm, normal heart sounds and intact distal pulses. No murmur heard. Pulmonary/Chest: Effort normal and breath sounds normal. No respiratory distress. He has no wheezes. He has no rales. Musculoskeletal: He exhibits no edema. Lymphadenopathy:     He has no cervical adenopathy. Neurological: He is alert and oriented to person, place, and time. Skin: Skin is warm and dry. Psychiatric: He has a normal mood and affect.  His behavior is normal. Judgment and thought content normal.     Chest x-ray from 10/26/18 and 10/29/18 were reviewed. Right upper lobe infiltrate is seen, slightly better on the follow-up film. Assessment:      Multiple myeloma, relapsed, on active chemotherapy. He has side effect problems associated with this, particularly neutropenia with risk of infection, and worsening anemia. These issues, with deconditioning, are probably the cause of his increased shortness of breath. Note that pulmonary function when he was in the [facility] was consistent with only mild ventilatory restriction, no airflow obstruction. His cough is under better control, suggesting that airways disease is not an issue. Plan:      Continue current ICS/LABA and gabapentin for cough. He should try to maintain walking within his domicile to try to keep up some level of conditioning. We had some discussion regarding the limitations of treatment, the problems of side effects associated with active therapy, and the lack of treatment options if this fails.   He is meeting with palliative care        Blaine Carter MD

## 2018-12-06 NOTE — TELEPHONE ENCOUNTER
Reviewed CT scan, consistent with radiation pneumonitis.   Rx prednisone 30 mg daily  Discussed with Mr Curtis Hubbard

## 2018-12-10 NOTE — PLAN OF CARE
Problem: Bleeding:  Intervention: Assess signs and symptoms of excessive bleeding  Patient's hemoglobin this AM: 9.5  Patient's platelet count this AM: 18.0  Pt seen and assessed at [facility]. Seen at [address] [place] [address] today for one Platelet transfusion per standing orders for above lab values. Blood products transfused per [facility] policy. Pt tolerated transfusion well and without incident. Pt verbalizes understanding of discharge instructions. Discharged ambulatory to home. Problem: Falls - Risk of:  Goal: Will remain free from falls  Will remain free from falls   Outcome: Met This Shift  Pt is a medium fall risk. Explained fall risk precautions to pt and rationale behind their use to keep the patient safe. Belongings are in reach. Pt encouraged to notify staff for any and all assistance. Staff present in tx suite throughout entirety of pts treatment to monitor and protect from falls. Assistance provided when ambulating to restroom utilizing Stay With Me.

## 2018-12-17 PROBLEM — J70.0 RADIATION PNEUMONITIS (HCC): Status: ACTIVE | Noted: 2018-01-01

## 2018-12-17 NOTE — PROGRESS NOTES
pneumonitis, based on recent therapy and changes on chest CT scan. So far, no response clinically to prednisone 30 or 40 mg daily. Cough is improving with multiple measures, including gabapentin and voiced training, use of resistive exercise. Multiple myeloma, relapsed, on active therapy      Plan:      Continue prednisone 30 mg daily.   Chest CT scan after 3 weeks  Influenza vaccine is recommended, when he gets the okay from Dr. Baldo Franklin is face-to-face encounter of 35 minutes, I spent greater than 50% of the time counseling the patient on issues regarding dyspnea, side effects of medications, cough and control of the same, role of exercise, avoidance of infections and influenza vaccine        Jessica Díaz MD

## 2018-12-21 NOTE — TELEPHONE ENCOUNTER
Patient is very nervous/concerned that [facility] sent him inhaler refills for ProAir HFA. He said in the past Dr Inocencia Meeks has always sent RX in for Ventolin. He does not want to change something that is already working. He asked that I pass this information along to Dr Inocencia Meeks and see what his opinion is on the change of inhalers.

## 2018-12-27 NOTE — PLAN OF CARE
Problem: KNOWLEDGE DEFICIT  Goal: Patient/S.O. demonstrates understanding of disease process, treatment plan, medications, and discharge instructions. Outcome: Ongoing  Patient's hemoglobin this AM 6.5  Patient's platelet count this AM: 18  Pt seen and assessed at [facility]. Seen at [address] [place] [address] today for 1 unit of SDP per MD orders for above lab values. Blood products transfused per [facility] policy. Pt tolerated transfusion well and without incident. Bleeding precautions reviewed with pt, no s/s of bleeding noted Pt verbalizes understanding of discharge instructions. Discharged ambulatory to home.

## 2019-01-01 ENCOUNTER — OFFICE VISIT (OUTPATIENT)
Dept: PULMONOLOGY | Age: 64
End: 2019-01-01
Payer: COMMERCIAL

## 2019-01-01 ENCOUNTER — HOSPITAL ENCOUNTER (OUTPATIENT)
Dept: ONCOLOGY | Age: 64
Setting detail: INFUSION SERIES
Discharge: HOME | End: 2019-02-21
Payer: COMMERCIAL

## 2019-01-01 ENCOUNTER — HOSPITAL ENCOUNTER (OUTPATIENT)
Dept: ONCOLOGY | Age: 64
Setting detail: INFUSION SERIES
Discharge: HOME | End: 2019-01-22
Payer: COMMERCIAL

## 2019-01-01 ENCOUNTER — HOSPITAL ENCOUNTER (OUTPATIENT)
Dept: GENERAL RADIOLOGY | Age: 64
Discharge: HOME | End: 2019-05-20
Payer: COMMERCIAL

## 2019-01-01 ENCOUNTER — TELEPHONE (OUTPATIENT)
Dept: PULMONOLOGY | Age: 64
End: 2019-01-01

## 2019-01-01 ENCOUNTER — HOSPITAL ENCOUNTER (OUTPATIENT)
Dept: ONCOLOGY | Age: 64
Setting detail: INFUSION SERIES
Discharge: HOME | End: 2019-02-14
Payer: COMMERCIAL

## 2019-01-01 ENCOUNTER — HOSPITAL ENCOUNTER (OUTPATIENT)
Dept: ONCOLOGY | Age: 64
Setting detail: INFUSION SERIES
Discharge: HOME | End: 2019-04-12
Payer: COMMERCIAL

## 2019-01-01 ENCOUNTER — HOSPITAL ENCOUNTER (OUTPATIENT)
Dept: GENERAL RADIOLOGY | Age: 64
Discharge: HOME | End: 2019-03-18
Payer: COMMERCIAL

## 2019-01-01 ENCOUNTER — HOSPITAL ENCOUNTER (OUTPATIENT)
Dept: ONCOLOGY | Age: 64
Setting detail: INFUSION SERIES
Discharge: HOME | End: 2019-05-29
Payer: COMMERCIAL

## 2019-01-01 ENCOUNTER — HOSPITAL ENCOUNTER (OUTPATIENT)
Dept: ONCOLOGY | Age: 64
Setting detail: INFUSION SERIES
Discharge: HOME | End: 2019-06-10
Payer: COMMERCIAL

## 2019-01-01 ENCOUNTER — HOSPITAL ENCOUNTER (OUTPATIENT)
Dept: NON INVASIVE DIAGNOSTICS | Age: 64
Discharge: HOME | End: 2019-02-05
Payer: COMMERCIAL

## 2019-01-01 ENCOUNTER — HOSPITAL ENCOUNTER (OUTPATIENT)
Dept: GENERAL RADIOLOGY | Age: 64
Discharge: HOME | End: 2019-01-24
Payer: COMMERCIAL

## 2019-01-01 ENCOUNTER — HOSPITAL ENCOUNTER (OUTPATIENT)
Dept: ONCOLOGY | Age: 64
Setting detail: INFUSION SERIES
Discharge: HOME | End: 2019-06-19
Payer: COMMERCIAL

## 2019-01-01 ENCOUNTER — HOSPITAL ENCOUNTER (OUTPATIENT)
Dept: ONCOLOGY | Age: 64
Setting detail: INFUSION SERIES
Discharge: HOME | End: 2019-03-01
Payer: COMMERCIAL

## 2019-01-01 ENCOUNTER — HOSPITAL ENCOUNTER (OUTPATIENT)
Age: 64
Discharge: HOME | End: 2019-05-20
Payer: COMMERCIAL

## 2019-01-01 ENCOUNTER — HOSPITAL ENCOUNTER (OUTPATIENT)
Dept: ONCOLOGY | Age: 64
Setting detail: INFUSION SERIES
Discharge: HOME | End: 2019-02-05
Payer: COMMERCIAL

## 2019-01-01 ENCOUNTER — HOSPITAL ENCOUNTER (INPATIENT)
Age: 64
LOS: 2 days | Discharge: HOME | DRG: 809 | End: 2019-03-06
Attending: INTERNAL MEDICINE | Admitting: INTERNAL MEDICINE
Payer: COMMERCIAL

## 2019-01-01 ENCOUNTER — HOSPITAL ENCOUNTER (OUTPATIENT)
Age: 64
Discharge: HOME | End: 2019-01-24
Payer: COMMERCIAL

## 2019-01-01 ENCOUNTER — APPOINTMENT (OUTPATIENT)
Dept: CT IMAGING | Age: 64
End: 2019-01-01
Payer: COMMERCIAL

## 2019-01-01 ENCOUNTER — HOSPITAL ENCOUNTER (EMERGENCY)
Age: 64
Discharge: HOME | End: 2019-06-26
Attending: EMERGENCY MEDICINE
Payer: COMMERCIAL

## 2019-01-01 ENCOUNTER — HOSPITAL ENCOUNTER (OUTPATIENT)
Dept: ONCOLOGY | Age: 64
Setting detail: INFUSION SERIES
Discharge: HOME | End: 2019-02-24
Payer: COMMERCIAL

## 2019-01-01 ENCOUNTER — HOSPITAL ENCOUNTER (OUTPATIENT)
Dept: ONCOLOGY | Age: 64
Setting detail: INFUSION SERIES
Discharge: HOME | End: 2019-02-04
Payer: COMMERCIAL

## 2019-01-01 ENCOUNTER — APPOINTMENT (OUTPATIENT)
Dept: GENERAL RADIOLOGY | Age: 64
DRG: 809 | End: 2019-01-01
Attending: INTERNAL MEDICINE
Payer: COMMERCIAL

## 2019-01-01 ENCOUNTER — HOSPITAL ENCOUNTER (OUTPATIENT)
Dept: GENERAL RADIOLOGY | Age: 64
Discharge: HOME | End: 2019-04-10
Payer: COMMERCIAL

## 2019-01-01 ENCOUNTER — HOSPITAL ENCOUNTER (OUTPATIENT)
Age: 64
Discharge: HOME | End: 2019-04-10
Payer: COMMERCIAL

## 2019-01-01 ENCOUNTER — HOSPITAL ENCOUNTER (OUTPATIENT)
Dept: ONCOLOGY | Age: 64
Setting detail: INFUSION SERIES
Discharge: HOME | End: 2019-02-28
Payer: COMMERCIAL

## 2019-01-01 ENCOUNTER — HOSPITAL ENCOUNTER (OUTPATIENT)
Dept: ONCOLOGY | Age: 64
Setting detail: INFUSION SERIES
Discharge: HOME | End: 2019-05-21
Payer: COMMERCIAL

## 2019-01-01 ENCOUNTER — HOSPITAL ENCOUNTER (OUTPATIENT)
Age: 64
Discharge: HOME | End: 2019-03-18
Payer: COMMERCIAL

## 2019-01-01 VITALS
BODY MASS INDEX: 25.76 KG/M2 | OXYGEN SATURATION: 99 % | HEART RATE: 118 BPM | WEIGHT: 170 LBS | HEIGHT: 68 IN | SYSTOLIC BLOOD PRESSURE: 122 MMHG | DIASTOLIC BLOOD PRESSURE: 74 MMHG | RESPIRATION RATE: 16 BRPM

## 2019-01-01 VITALS
TEMPERATURE: 98.9 F | SYSTOLIC BLOOD PRESSURE: 102 MMHG | DIASTOLIC BLOOD PRESSURE: 65 MMHG | RESPIRATION RATE: 18 BRPM | HEART RATE: 97 BPM

## 2019-01-01 VITALS
SYSTOLIC BLOOD PRESSURE: 147 MMHG | TEMPERATURE: 98.5 F | DIASTOLIC BLOOD PRESSURE: 88 MMHG | RESPIRATION RATE: 18 BRPM | HEART RATE: 125 BPM

## 2019-01-01 VITALS
HEART RATE: 106 BPM | DIASTOLIC BLOOD PRESSURE: 78 MMHG | TEMPERATURE: 98.2 F | RESPIRATION RATE: 18 BRPM | SYSTOLIC BLOOD PRESSURE: 123 MMHG

## 2019-01-01 VITALS
WEIGHT: 171.2 LBS | DIASTOLIC BLOOD PRESSURE: 80 MMHG | SYSTOLIC BLOOD PRESSURE: 131 MMHG | TEMPERATURE: 98.7 F | HEART RATE: 100 BPM | HEIGHT: 68 IN | OXYGEN SATURATION: 96 % | RESPIRATION RATE: 16 BRPM | BODY MASS INDEX: 25.94 KG/M2

## 2019-01-01 VITALS
BODY MASS INDEX: 25.77 KG/M2 | WEIGHT: 180 LBS | HEIGHT: 70 IN | OXYGEN SATURATION: 95 % | TEMPERATURE: 97.9 F | DIASTOLIC BLOOD PRESSURE: 88 MMHG | SYSTOLIC BLOOD PRESSURE: 150 MMHG | RESPIRATION RATE: 14 BRPM | HEART RATE: 106 BPM

## 2019-01-01 VITALS
RESPIRATION RATE: 20 BRPM | TEMPERATURE: 98.3 F | DIASTOLIC BLOOD PRESSURE: 69 MMHG | HEART RATE: 114 BPM | SYSTOLIC BLOOD PRESSURE: 126 MMHG | OXYGEN SATURATION: 96 %

## 2019-01-01 VITALS
DIASTOLIC BLOOD PRESSURE: 58 MMHG | WEIGHT: 173.8 LBS | HEART RATE: 114 BPM | HEIGHT: 68 IN | OXYGEN SATURATION: 99 % | BODY MASS INDEX: 26.34 KG/M2 | SYSTOLIC BLOOD PRESSURE: 118 MMHG

## 2019-01-01 VITALS
DIASTOLIC BLOOD PRESSURE: 88 MMHG | HEART RATE: 116 BPM | TEMPERATURE: 97.8 F | RESPIRATION RATE: 20 BRPM | SYSTOLIC BLOOD PRESSURE: 145 MMHG

## 2019-01-01 VITALS
WEIGHT: 183 LBS | BODY MASS INDEX: 27.74 KG/M2 | HEART RATE: 145 BPM | DIASTOLIC BLOOD PRESSURE: 74 MMHG | HEIGHT: 68 IN | SYSTOLIC BLOOD PRESSURE: 132 MMHG | OXYGEN SATURATION: 97 %

## 2019-01-01 VITALS
TEMPERATURE: 98.1 F | RESPIRATION RATE: 16 BRPM | SYSTOLIC BLOOD PRESSURE: 143 MMHG | DIASTOLIC BLOOD PRESSURE: 71 MMHG | HEART RATE: 116 BPM

## 2019-01-01 VITALS
SYSTOLIC BLOOD PRESSURE: 128 MMHG | DIASTOLIC BLOOD PRESSURE: 78 MMHG | OXYGEN SATURATION: 97 % | WEIGHT: 184.6 LBS | BODY MASS INDEX: 27.98 KG/M2 | HEIGHT: 68 IN

## 2019-01-01 VITALS
RESPIRATION RATE: 18 BRPM | HEART RATE: 94 BPM | TEMPERATURE: 97.8 F | DIASTOLIC BLOOD PRESSURE: 78 MMHG | SYSTOLIC BLOOD PRESSURE: 119 MMHG

## 2019-01-01 VITALS
TEMPERATURE: 98.1 F | HEART RATE: 109 BPM | RESPIRATION RATE: 18 BRPM | DIASTOLIC BLOOD PRESSURE: 85 MMHG | SYSTOLIC BLOOD PRESSURE: 159 MMHG

## 2019-01-01 VITALS
SYSTOLIC BLOOD PRESSURE: 131 MMHG | RESPIRATION RATE: 18 BRPM | TEMPERATURE: 97.7 F | DIASTOLIC BLOOD PRESSURE: 84 MMHG | HEART RATE: 119 BPM

## 2019-01-01 VITALS
RESPIRATION RATE: 16 BRPM | HEART RATE: 105 BPM | DIASTOLIC BLOOD PRESSURE: 86 MMHG | SYSTOLIC BLOOD PRESSURE: 135 MMHG | TEMPERATURE: 97.4 F

## 2019-01-01 VITALS
RESPIRATION RATE: 16 BRPM | DIASTOLIC BLOOD PRESSURE: 67 MMHG | TEMPERATURE: 97.9 F | HEART RATE: 112 BPM | SYSTOLIC BLOOD PRESSURE: 115 MMHG

## 2019-01-01 VITALS
DIASTOLIC BLOOD PRESSURE: 76 MMHG | HEART RATE: 102 BPM | RESPIRATION RATE: 16 BRPM | TEMPERATURE: 97.9 F | SYSTOLIC BLOOD PRESSURE: 130 MMHG

## 2019-01-01 DIAGNOSIS — C90.02 MULTIPLE MYELOMA IN RELAPSE (HCC): Primary | ICD-10-CM

## 2019-01-01 DIAGNOSIS — C90.02 MULTIPLE MYELOMA IN RELAPSE (HCC): ICD-10-CM

## 2019-01-01 DIAGNOSIS — J21.9 BRONCHIOLITIS: ICD-10-CM

## 2019-01-01 DIAGNOSIS — N18.9 ACUTE ON CHRONIC RENAL INSUFFICIENCY: ICD-10-CM

## 2019-01-01 DIAGNOSIS — G47.33 OSA (OBSTRUCTIVE SLEEP APNEA): Chronic | ICD-10-CM

## 2019-01-01 DIAGNOSIS — C90.01 MULTIPLE MYELOMA IN REMISSION (HCC): ICD-10-CM

## 2019-01-01 DIAGNOSIS — J21.9 BRONCHIOLITIS: Primary | ICD-10-CM

## 2019-01-01 DIAGNOSIS — J01.01 ACUTE RECURRENT MAXILLARY SINUSITIS: Primary | ICD-10-CM

## 2019-01-01 DIAGNOSIS — J20.9 ACUTE BRONCHITIS, UNSPECIFIED ORGANISM: Primary | ICD-10-CM

## 2019-01-01 DIAGNOSIS — J70.0 RADIATION PNEUMONITIS (HCC): ICD-10-CM

## 2019-01-01 DIAGNOSIS — J45.41 MODERATE PERSISTENT ASTHMA WITH EXACERBATION: Primary | ICD-10-CM

## 2019-01-01 DIAGNOSIS — C90.00 MULTIPLE MYELOMA NOT HAVING ACHIEVED REMISSION (HCC): ICD-10-CM

## 2019-01-01 DIAGNOSIS — R73.9 HYPERGLYCEMIA: Primary | ICD-10-CM

## 2019-01-01 DIAGNOSIS — C90.00 MULTIPLE MYELOMA, REMISSION STATUS UNSPECIFIED (HCC): ICD-10-CM

## 2019-01-01 DIAGNOSIS — N28.9 ACUTE ON CHRONIC RENAL INSUFFICIENCY: ICD-10-CM

## 2019-01-01 DIAGNOSIS — J70.0 RADIATION PNEUMONITIS (HCC): Primary | ICD-10-CM

## 2019-01-01 DIAGNOSIS — R05.9 COUGH: ICD-10-CM

## 2019-01-01 DIAGNOSIS — C90.01 MULTIPLE MYELOMA IN REMISSION (HCC): Primary | ICD-10-CM

## 2019-01-01 DIAGNOSIS — M54.9 BACK PAIN, UNSPECIFIED BACK LOCATION, UNSPECIFIED BACK PAIN LATERALITY, UNSPECIFIED CHRONICITY: ICD-10-CM

## 2019-01-01 DIAGNOSIS — R10.84 GENERALIZED ABDOMINAL PAIN: ICD-10-CM

## 2019-01-01 LAB
ABO/RH: NORMAL
ALBUMIN SERPL-MCNC: 2.9 G/DL (ref 3.4–5)
ALBUMIN SERPL-MCNC: 3 G/DL (ref 3.4–5)
ALBUMIN SERPL-MCNC: 4.1 G/DL (ref 3.4–5)
ALP BLD-CCNC: 191 U/L (ref 40–129)
ALP BLD-CCNC: 72 U/L (ref 40–129)
ALP BLD-CCNC: 78 U/L (ref 40–129)
ALT SERPL-CCNC: 54 U/L (ref 10–40)
ALT SERPL-CCNC: 56 U/L (ref 10–40)
ALT SERPL-CCNC: 96 U/L (ref 10–40)
AMORPHOUS: ABNORMAL /HPF
ANION GAP SERPL CALCULATED.3IONS-SCNC: 13 MMOL/L (ref 3–16)
ANION GAP SERPL CALCULATED.3IONS-SCNC: 15 MMOL/L (ref 3–16)
ANION GAP SERPL CALCULATED.3IONS-SCNC: 18 MMOL/L (ref 3–16)
ANION GAP SERPL CALCULATED.3IONS-SCNC: 20 MMOL/L (ref 3–16)
ANISOCYTOSIS: ABNORMAL
ANTIBODY SCREEN: NORMAL
AST SERPL-CCNC: 29 U/L (ref 15–37)
AST SERPL-CCNC: 36 U/L (ref 15–37)
AST SERPL-CCNC: 50 U/L (ref 15–37)
BACTERIA: ABNORMAL /HPF
BANDED NEUTROPHILS RELATIVE PERCENT: 1 % (ref 0–7)
BASE EXCESS VENOUS: 5 (ref -3–3)
BASOPHILS ABSOLUTE: 0 K/UL (ref 0–0.2)
BASOPHILS ABSOLUTE: 0 K/UL (ref 0–0.2)
BASOPHILS RELATIVE PERCENT: 0 %
BASOPHILS RELATIVE PERCENT: 0.4 %
BILIRUB SERPL-MCNC: 0.6 MG/DL (ref 0–1)
BILIRUB SERPL-MCNC: 0.6 MG/DL (ref 0–1)
BILIRUB SERPL-MCNC: 0.8 MG/DL (ref 0–1)
BILIRUBIN DIRECT: 0.3 MG/DL (ref 0–0.3)
BILIRUBIN DIRECT: <0.2 MG/DL (ref 0–0.3)
BILIRUBIN DIRECT: <0.2 MG/DL (ref 0–0.3)
BILIRUBIN URINE: NEGATIVE
BILIRUBIN, INDIRECT: 0.3 MG/DL (ref 0–1)
BILIRUBIN, INDIRECT: ABNORMAL MG/DL (ref 0–1)
BILIRUBIN, INDIRECT: ABNORMAL MG/DL (ref 0–1)
BLOOD BANK DISPENSE STATUS: NORMAL
BLOOD BANK PRODUCT CODE: NORMAL
BLOOD, URINE: ABNORMAL
BPU ID: NORMAL
BUN BLDV-MCNC: 14 MG/DL (ref 7–20)
BUN BLDV-MCNC: 19 MG/DL (ref 7–20)
BUN BLDV-MCNC: 60 MG/DL (ref 7–20)
BUN BLDV-MCNC: 65 MG/DL (ref 7–20)
CALCIUM SERPL-MCNC: 10.3 MG/DL (ref 8.3–10.6)
CALCIUM SERPL-MCNC: 10.5 MG/DL (ref 8.3–10.6)
CALCIUM SERPL-MCNC: 8.1 MG/DL (ref 8.3–10.6)
CALCIUM SERPL-MCNC: 8.3 MG/DL (ref 8.3–10.6)
CHLORIDE BLD-SCNC: 104 MMOL/L (ref 99–110)
CHLORIDE BLD-SCNC: 109 MMOL/L (ref 99–110)
CHLORIDE BLD-SCNC: 88 MMOL/L (ref 99–110)
CHLORIDE BLD-SCNC: 99 MMOL/L (ref 99–110)
CLARITY: CLEAR
CO2: 21 MMOL/L (ref 21–32)
CO2: 21 MMOL/L (ref 21–32)
CO2: 27 MMOL/L (ref 21–32)
CO2: 27 MMOL/L (ref 21–32)
COLOR: YELLOW
CREAT SERPL-MCNC: 1.8 MG/DL (ref 0.8–1.3)
CREAT SERPL-MCNC: 2 MG/DL (ref 0.8–1.3)
CREAT SERPL-MCNC: 2.2 MG/DL (ref 0.8–1.3)
CREAT SERPL-MCNC: 2.8 MG/DL (ref 0.8–1.3)
CREAT SERPL-MCNC: 2.9 MG/DL (ref 0.8–1.3)
DESCRIPTION BLOOD BANK: NORMAL
EOSINOPHILS ABSOLUTE: 0 K/UL (ref 0–0.6)
EOSINOPHILS ABSOLUTE: 0 K/UL (ref 0–0.6)
EOSINOPHILS RELATIVE PERCENT: 0 %
EOSINOPHILS RELATIVE PERCENT: 0.3 %
GFR AFRICAN AMERICAN: 27
GFR AFRICAN AMERICAN: 28
GFR AFRICAN AMERICAN: 37
GFR AFRICAN AMERICAN: 41
GFR AFRICAN AMERICAN: 46
GFR NON-AFRICAN AMERICAN: 22
GFR NON-AFRICAN AMERICAN: 23
GFR NON-AFRICAN AMERICAN: 30
GFR NON-AFRICAN AMERICAN: 34
GFR NON-AFRICAN AMERICAN: 38
GLUCOSE BLD-MCNC: 103 MG/DL (ref 70–99)
GLUCOSE BLD-MCNC: 112 MG/DL (ref 70–99)
GLUCOSE BLD-MCNC: 369 MG/DL (ref 70–99)
GLUCOSE BLD-MCNC: 433 MG/DL (ref 70–99)
GLUCOSE BLD-MCNC: 891 MG/DL (ref 70–99)
GLUCOSE BLD-MCNC: >600 MG/DL (ref 70–99)
GLUCOSE BLD-MCNC: >600 MG/DL (ref 70–99)
GLUCOSE URINE: >=1000 MG/DL
HCO3 VENOUS: 30.1 MMOL/L (ref 23–29)
HCT VFR BLD CALC: 19.3 % (ref 40.5–52.5)
HCT VFR BLD CALC: 19.4 % (ref 40.5–52.5)
HCT VFR BLD CALC: 23.3 % (ref 40.5–52.5)
HCT VFR BLD CALC: 28 % (ref 40.5–52.5)
HEMOGLOBIN: 6.3 G/DL (ref 13.5–17.5)
HEMOGLOBIN: 6.4 G/DL (ref 13.5–17.5)
HEMOGLOBIN: 7.7 G/DL (ref 13.5–17.5)
HEMOGLOBIN: 9.3 G/DL (ref 13.5–17.5)
KETONES, URINE: NEGATIVE MG/DL
LACTATE: 4.7 MMOL/L (ref 0.4–2)
LEUKOCYTE ESTERASE, URINE: NEGATIVE
LIPASE: 114 U/L (ref 13–60)
LV EF: 53 %
LVEF MODALITY: NORMAL
LYMPHOCYTES ABSOLUTE: 0.2 K/UL (ref 1–5.1)
LYMPHOCYTES ABSOLUTE: 0.5 K/UL (ref 1–5.1)
LYMPHOCYTES RELATIVE PERCENT: 11 %
LYMPHOCYTES RELATIVE PERCENT: 9.6 %
MAGNESIUM: 2.2 MG/DL (ref 1.8–2.4)
MAGNESIUM: 2.2 MG/DL (ref 1.8–2.4)
MCH RBC QN AUTO: 33.8 PG (ref 26–34)
MCH RBC QN AUTO: 34.4 PG (ref 26–34)
MCH RBC QN AUTO: 40 PG (ref 26–34)
MCH RBC QN AUTO: 40.2 PG (ref 26–34)
MCHC RBC AUTO-ENTMCNC: 32.6 G/DL (ref 31–36)
MCHC RBC AUTO-ENTMCNC: 33.1 G/DL (ref 31–36)
MCHC RBC AUTO-ENTMCNC: 33.2 G/DL (ref 31–36)
MCHC RBC AUTO-ENTMCNC: 33.4 G/DL (ref 31–36)
MCV RBC AUTO: 101.8 FL (ref 80–100)
MCV RBC AUTO: 105.7 FL (ref 80–100)
MCV RBC AUTO: 119.8 FL (ref 80–100)
MCV RBC AUTO: 121.5 FL (ref 80–100)
MICROSCOPIC EXAMINATION: YES
MONOCYTES ABSOLUTE: 0.1 K/UL (ref 0–1.3)
MONOCYTES ABSOLUTE: 0.2 K/UL (ref 0–1.3)
MONOCYTES RELATIVE PERCENT: 3 %
MONOCYTES RELATIVE PERCENT: 7.1 %
NEUTROPHILS ABSOLUTE: 2 K/UL (ref 1.7–7.7)
NEUTROPHILS ABSOLUTE: 3.7 K/UL (ref 1.7–7.7)
NEUTROPHILS RELATIVE PERCENT: 82.6 %
NEUTROPHILS RELATIVE PERCENT: 85 %
NITRITE, URINE: NEGATIVE
O2 SAT, VEN: 39 %
PCO2, VEN: 47.9 MM HG (ref 40–50)
PDW BLD-RTO: 18.5 % (ref 12.4–15.4)
PDW BLD-RTO: 20 % (ref 12.4–15.4)
PDW BLD-RTO: 27 % (ref 12.4–15.4)
PDW BLD-RTO: 27.2 % (ref 12.4–15.4)
PERFORMED ON: ABNORMAL
PH UA: 5.5 (ref 5–8)
PH VENOUS: 7.41 (ref 7.35–7.45)
PHOSPHORUS: 3.4 MG/DL (ref 2.5–4.9)
PHOSPHORUS: 4.6 MG/DL (ref 2.5–4.9)
PLATELET # BLD: 19 K/UL (ref 135–450)
PLATELET # BLD: 30 K/UL (ref 135–450)
PLATELET # BLD: 31 K/UL (ref 135–450)
PLATELET # BLD: 31 K/UL (ref 135–450)
PMV BLD AUTO: 8.5 FL (ref 5–10.5)
PMV BLD AUTO: 8.8 FL (ref 5–10.5)
PMV BLD AUTO: 9.3 FL (ref 5–10.5)
PMV BLD AUTO: 9.3 FL (ref 5–10.5)
PO2, VEN: 23 MM HG
POC SAMPLE TYPE: ABNORMAL
POTASSIUM SERPL-SCNC: 3.1 MMOL/L (ref 3.5–5.1)
POTASSIUM SERPL-SCNC: 3.6 MMOL/L (ref 3.5–5.1)
POTASSIUM SERPL-SCNC: 4.6 MMOL/L (ref 3.5–5.1)
POTASSIUM SERPL-SCNC: 4.7 MMOL/L (ref 3.5–5.1)
PROTEIN UA: 30 MG/DL
RAPID INFLUENZA  B AGN: NEGATIVE
RAPID INFLUENZA A AGN: NEGATIVE
RBC # BLD: 1.59 M/UL (ref 4.2–5.9)
RBC # BLD: 1.83 M/UL (ref 4.2–5.9)
RBC # BLD: 2.29 M/UL (ref 4.2–5.9)
RBC # BLD: 2.33 M/UL (ref 4.2–5.9)
RBC UA: ABNORMAL /HPF (ref 0–2)
SODIUM BLD-SCNC: 135 MMOL/L (ref 136–145)
SODIUM BLD-SCNC: 140 MMOL/L (ref 136–145)
SODIUM BLD-SCNC: 143 MMOL/L (ref 136–145)
SODIUM BLD-SCNC: 144 MMOL/L (ref 136–145)
SPECIFIC GRAVITY UA: <=1.005 (ref 1–1.03)
TCO2 CALC VENOUS: 32 MMOL/L
TOTAL PROTEIN: 5.5 G/DL (ref 6.4–8.2)
TOTAL PROTEIN: 5.6 G/DL (ref 6.4–8.2)
TOTAL PROTEIN: 6.7 G/DL (ref 6.4–8.2)
URIC ACID, SERUM: 3.5 MG/DL (ref 3.5–7.2)
URIC ACID, SERUM: 4 MG/DL (ref 3.5–7.2)
URINE TYPE: ABNORMAL
UROBILINOGEN, URINE: 0.2 E.U./DL
VRE CULTURE: NORMAL
WBC # BLD: 0.9 K/UL (ref 4–11)
WBC # BLD: 2.4 K/UL (ref 4–11)
WBC # BLD: 2.5 K/UL (ref 4–11)
WBC # BLD: 4.3 K/UL (ref 4–11)
WBC UA: ABNORMAL /HPF (ref 0–5)

## 2019-01-01 PROCEDURE — 36430 TRANSFUSION BLD/BLD COMPNT: CPT

## 2019-01-01 PROCEDURE — 96361 HYDRATE IV INFUSION ADD-ON: CPT

## 2019-01-01 PROCEDURE — 99213 OFFICE O/P EST LOW 20 MIN: CPT | Performed by: INTERNAL MEDICINE

## 2019-01-01 PROCEDURE — 99211 OFF/OP EST MAY X REQ PHY/QHP: CPT

## 2019-01-01 PROCEDURE — 2580000003 HC RX 258: Performed by: EMERGENCY MEDICINE

## 2019-01-01 PROCEDURE — 96360 HYDRATION IV INFUSION INIT: CPT

## 2019-01-01 PROCEDURE — 84550 ASSAY OF BLOOD/URIC ACID: CPT

## 2019-01-01 PROCEDURE — P9037 PLATE PHERES LEUKOREDU IRRAD: HCPCS

## 2019-01-01 PROCEDURE — 85025 COMPLETE CBC W/AUTO DIFF WBC: CPT

## 2019-01-01 PROCEDURE — 6370000000 HC RX 637 (ALT 250 FOR IP): Performed by: INTERNAL MEDICINE

## 2019-01-01 PROCEDURE — 2580000003 HC RX 258: Performed by: INTERNAL MEDICINE

## 2019-01-01 PROCEDURE — 36415 COLL VENOUS BLD VENIPUNCTURE: CPT

## 2019-01-01 PROCEDURE — 84100 ASSAY OF PHOSPHORUS: CPT

## 2019-01-01 PROCEDURE — 86923 COMPATIBILITY TEST ELECTRIC: CPT

## 2019-01-01 PROCEDURE — 94664 DEMO&/EVAL PT USE INHALER: CPT

## 2019-01-01 PROCEDURE — 51798 US URINE CAPACITY MEASURE: CPT

## 2019-01-01 PROCEDURE — 80048 BASIC METABOLIC PNL TOTAL CA: CPT

## 2019-01-01 PROCEDURE — 6370000000 HC RX 637 (ALT 250 FOR IP): Performed by: EMERGENCY MEDICINE

## 2019-01-01 PROCEDURE — 85027 COMPLETE CBC AUTOMATED: CPT

## 2019-01-01 PROCEDURE — 87804 INFLUENZA ASSAY W/OPTIC: CPT

## 2019-01-01 PROCEDURE — 86900 BLOOD TYPING SEROLOGIC ABO: CPT

## 2019-01-01 PROCEDURE — 36430 TRANSFUSION BLD/BLD COMPNT: CPT | Performed by: NURSE PRACTITIONER

## 2019-01-01 PROCEDURE — 71046 X-RAY EXAM CHEST 2 VIEWS: CPT

## 2019-01-01 PROCEDURE — 86850 RBC ANTIBODY SCREEN: CPT

## 2019-01-01 PROCEDURE — 74176 CT ABD & PELVIS W/O CONTRAST: CPT

## 2019-01-01 PROCEDURE — 99214 OFFICE O/P EST MOD 30 MIN: CPT | Performed by: INTERNAL MEDICINE

## 2019-01-01 PROCEDURE — 6360000002 HC RX W HCPCS: Performed by: INTERNAL MEDICINE

## 2019-01-01 PROCEDURE — 83735 ASSAY OF MAGNESIUM: CPT

## 2019-01-01 PROCEDURE — 80076 HEPATIC FUNCTION PANEL: CPT

## 2019-01-01 PROCEDURE — 82803 BLOOD GASES ANY COMBINATION: CPT

## 2019-01-01 PROCEDURE — 2060000000 HC ICU INTERMEDIATE R&B

## 2019-01-01 PROCEDURE — P9040 RBC LEUKOREDUCED IRRADIATED: HCPCS

## 2019-01-01 PROCEDURE — 86901 BLOOD TYPING SEROLOGIC RH(D): CPT

## 2019-01-01 PROCEDURE — 82565 ASSAY OF CREATININE: CPT

## 2019-01-01 PROCEDURE — 96374 THER/PROPH/DIAG INJ IV PUSH: CPT

## 2019-01-01 PROCEDURE — 72070 X-RAY EXAM THORAC SPINE 2VWS: CPT

## 2019-01-01 PROCEDURE — 81001 URINALYSIS AUTO W/SCOPE: CPT

## 2019-01-01 PROCEDURE — 83690 ASSAY OF LIPASE: CPT

## 2019-01-01 PROCEDURE — 6370000000 HC RX 637 (ALT 250 FOR IP): Performed by: NURSE PRACTITIONER

## 2019-01-01 PROCEDURE — 73502 X-RAY EXAM HIP UNI 2-3 VIEWS: CPT

## 2019-01-01 PROCEDURE — 93306 TTE W/DOPPLER COMPLETE: CPT

## 2019-01-01 PROCEDURE — 99211 OFF/OP EST MAY X REQ PHY/QHP: CPT | Performed by: NURSE PRACTITIONER

## 2019-01-01 PROCEDURE — 83605 ASSAY OF LACTIC ACID: CPT

## 2019-01-01 PROCEDURE — 99285 EMERGENCY DEPT VISIT HI MDM: CPT

## 2019-01-01 PROCEDURE — 77075 RADEX OSSEOUS SURVEY COMPL: CPT

## 2019-01-01 PROCEDURE — 87081 CULTURE SCREEN ONLY: CPT

## 2019-01-01 PROCEDURE — 6360000002 HC RX W HCPCS: Performed by: NURSE PRACTITIONER

## 2019-01-01 PROCEDURE — 89220 SPUTUM SPECIMEN COLLECTION: CPT

## 2019-01-01 RX ORDER — SODIUM CHLORIDE 0.9 % (FLUSH) 0.9 %
20 SYRINGE (ML) INJECTION PRN
Status: CANCELLED | OUTPATIENT
Start: 2019-01-01

## 2019-01-01 RX ORDER — SODIUM CHLORIDE 9 MG/ML
INJECTION, SOLUTION INTRAVENOUS CONTINUOUS
Status: CANCELLED | OUTPATIENT
Start: 2019-01-01

## 2019-01-01 RX ORDER — DIPHENHYDRAMINE HCL 25 MG
25 TABLET ORAL ONCE
Status: CANCELLED | OUTPATIENT
Start: 2019-01-01 | End: 2019-01-01

## 2019-01-01 RX ORDER — METHYLPREDNISOLONE SODIUM SUCCINATE 125 MG/2ML
125 INJECTION, POWDER, LYOPHILIZED, FOR SOLUTION INTRAMUSCULAR; INTRAVENOUS ONCE
Status: CANCELLED | OUTPATIENT
Start: 2019-01-01

## 2019-01-01 RX ORDER — FUROSEMIDE 10 MG/ML
20 INJECTION INTRAMUSCULAR; INTRAVENOUS ONCE
Status: CANCELLED | OUTPATIENT
Start: 2019-01-01 | End: 2019-01-01

## 2019-01-01 RX ORDER — EPINEPHRINE 1 MG/ML
0.3 INJECTION, SOLUTION, CONCENTRATE INTRAVENOUS PRN
Status: CANCELLED | OUTPATIENT
Start: 2019-01-01

## 2019-01-01 RX ORDER — ACETAMINOPHEN 325 MG/1
650 TABLET ORAL ONCE
Status: CANCELLED | OUTPATIENT
Start: 2019-01-01 | End: 2019-01-01

## 2019-01-01 RX ORDER — DIPHENHYDRAMINE HYDROCHLORIDE 50 MG/ML
50 INJECTION INTRAMUSCULAR; INTRAVENOUS ONCE
Status: CANCELLED | OUTPATIENT
Start: 2019-01-01

## 2019-01-01 RX ORDER — DIPHENHYDRAMINE HYDROCHLORIDE 50 MG/ML
25 INJECTION INTRAMUSCULAR; INTRAVENOUS ONCE
Status: CANCELLED | OUTPATIENT
Start: 2019-01-01 | End: 2019-01-01

## 2019-01-01 RX ORDER — 0.9 % SODIUM CHLORIDE 0.9 %
10 VIAL (ML) INJECTION ONCE
Status: CANCELLED | OUTPATIENT
Start: 2019-01-01

## 2019-01-01 RX ORDER — POTASSIUM CHLORIDE 1.5 G/1.77G
40 POWDER, FOR SOLUTION ORAL PRN
Qty: 30 EACH | Refills: 3 | Status: CANCELLED | OUTPATIENT
Start: 2019-01-01

## 2019-01-01 RX ORDER — POTASSIUM CHLORIDE 7.45 MG/ML
10 INJECTION INTRAVENOUS PRN
Status: DISCONTINUED | OUTPATIENT
Start: 2019-01-01 | End: 2019-01-01

## 2019-01-01 RX ORDER — MOMETASONE FUROATE 50 UG/1
1 SPRAY, METERED NASAL DAILY
Status: DISCONTINUED | OUTPATIENT
Start: 2019-01-01 | End: 2019-01-01 | Stop reason: CLARIF

## 2019-01-01 RX ORDER — DIPHENHYDRAMINE HYDROCHLORIDE 50 MG/ML
25 INJECTION INTRAMUSCULAR; INTRAVENOUS ONCE
Status: CANCELLED | OUTPATIENT
Start: 2019-01-01

## 2019-01-01 RX ORDER — METHYLPREDNISOLONE SODIUM SUCCINATE 125 MG/2ML
125 INJECTION, POWDER, LYOPHILIZED, FOR SOLUTION INTRAMUSCULAR; INTRAVENOUS ONCE
Status: CANCELLED | OUTPATIENT
Start: 2019-01-01 | End: 2019-01-01

## 2019-01-01 RX ORDER — CLONIDINE HYDROCHLORIDE 0.1 MG/1
0.1 TABLET ORAL NIGHTLY
Status: DISCONTINUED | OUTPATIENT
Start: 2019-01-01 | End: 2019-01-01 | Stop reason: HOSPADM

## 2019-01-01 RX ORDER — SODIUM CHLORIDE 9 MG/ML
INJECTION, SOLUTION INTRAVENOUS CONTINUOUS
Status: DISCONTINUED | OUTPATIENT
Start: 2019-01-01 | End: 2019-01-01 | Stop reason: HOSPADM

## 2019-01-01 RX ORDER — 0.9 % SODIUM CHLORIDE 0.9 %
10 VIAL (ML) INJECTION ONCE
Status: CANCELLED | OUTPATIENT
Start: 2019-01-01 | End: 2019-01-01

## 2019-01-01 RX ORDER — ISOSORBIDE MONONITRATE 30 MG/1
30 TABLET, EXTENDED RELEASE ORAL 2 TIMES DAILY
Status: DISCONTINUED | OUTPATIENT
Start: 2019-01-01 | End: 2019-01-01 | Stop reason: HOSPADM

## 2019-01-01 RX ORDER — DIPHENHYDRAMINE HCL 25 MG
25 TABLET ORAL ONCE
Status: CANCELLED | OUTPATIENT
Start: 2019-01-01

## 2019-01-01 RX ORDER — ACETAMINOPHEN 325 MG/1
650 TABLET ORAL
Status: COMPLETED | OUTPATIENT
Start: 2019-01-01 | End: 2019-01-01

## 2019-01-01 RX ORDER — ISOSORBIDE MONONITRATE 30 MG/1
30 TABLET, EXTENDED RELEASE ORAL 2 TIMES DAILY
Qty: 30 TABLET | Refills: 3 | Status: SHIPPED | OUTPATIENT
Start: 2019-01-01

## 2019-01-01 RX ORDER — DIPHENHYDRAMINE HYDROCHLORIDE 50 MG/ML
50 INJECTION INTRAMUSCULAR; INTRAVENOUS ONCE
Status: CANCELLED | OUTPATIENT
Start: 2019-01-01 | End: 2019-01-01

## 2019-01-01 RX ORDER — ACETAMINOPHEN 325 MG/1
650 TABLET ORAL ONCE
Status: COMPLETED | OUTPATIENT
Start: 2019-01-01 | End: 2019-01-01

## 2019-01-01 RX ORDER — FUROSEMIDE 10 MG/ML
20 INJECTION INTRAMUSCULAR; INTRAVENOUS ONCE
Status: CANCELLED | OUTPATIENT
Start: 2019-01-01

## 2019-01-01 RX ORDER — SODIUM CHLORIDE 9 MG/ML
INJECTION, SOLUTION INTRAVENOUS CONTINUOUS
Status: DISCONTINUED | OUTPATIENT
Start: 2019-01-01 | End: 2019-01-01

## 2019-01-01 RX ORDER — ALBUTEROL SULFATE 90 UG/1
2 AEROSOL, METERED RESPIRATORY (INHALATION) 2 TIMES DAILY
Status: DISCONTINUED | OUTPATIENT
Start: 2019-01-01 | End: 2019-01-01 | Stop reason: CLARIF

## 2019-01-01 RX ORDER — ACETAMINOPHEN 325 MG/1
650 TABLET ORAL ONCE
Status: CANCELLED | OUTPATIENT
Start: 2019-01-01

## 2019-01-01 RX ORDER — GABAPENTIN 300 MG/1
600 CAPSULE ORAL NIGHTLY
Qty: 90 CAPSULE | Refills: 3 | Status: CANCELLED | OUTPATIENT
Start: 2019-01-01 | End: 2019-01-01

## 2019-01-01 RX ORDER — BUDESONIDE 0.5 MG/2ML
0.5 INHALANT ORAL 2 TIMES DAILY PRN
Status: DISCONTINUED | OUTPATIENT
Start: 2019-01-01 | End: 2019-01-01 | Stop reason: HOSPADM

## 2019-01-01 RX ORDER — ACYCLOVIR 200 MG/1
400 CAPSULE ORAL 2 TIMES DAILY
Status: DISCONTINUED | OUTPATIENT
Start: 2019-01-01 | End: 2019-01-01 | Stop reason: HOSPADM

## 2019-01-01 RX ORDER — SODIUM CHLORIDE 0.9 % (FLUSH) 0.9 %
10 SYRINGE (ML) INJECTION PRN
Status: DISCONTINUED | OUTPATIENT
Start: 2019-01-01 | End: 2019-01-01 | Stop reason: HOSPADM

## 2019-01-01 RX ORDER — POTASSIUM CHLORIDE 20 MEQ/1
40 TABLET, EXTENDED RELEASE ORAL PRN
Qty: 60 TABLET | Refills: 3 | Status: CANCELLED | OUTPATIENT
Start: 2019-01-01

## 2019-01-01 RX ORDER — POTASSIUM CHLORIDE 7.45 MG/ML
10 INJECTION INTRAVENOUS PRN
Status: DISCONTINUED | OUTPATIENT
Start: 2019-01-01 | End: 2019-01-01 | Stop reason: HOSPADM

## 2019-01-01 RX ORDER — ALBUTEROL SULFATE 2.5 MG/3ML
2.5 SOLUTION RESPIRATORY (INHALATION) EVERY 4 HOURS PRN
Status: DISCONTINUED | OUTPATIENT
Start: 2019-01-01 | End: 2019-01-01 | Stop reason: HOSPADM

## 2019-01-01 RX ORDER — ALBUTEROL SULFATE 2.5 MG/3ML
2.5 SOLUTION RESPIRATORY (INHALATION) 2 TIMES DAILY
Status: DISCONTINUED | OUTPATIENT
Start: 2019-01-01 | End: 2019-01-01

## 2019-01-01 RX ORDER — 0.9 % SODIUM CHLORIDE 0.9 %
1000 INTRAVENOUS SOLUTION INTRAVENOUS ONCE
Status: COMPLETED | OUTPATIENT
Start: 2019-01-01 | End: 2019-01-01

## 2019-01-01 RX ORDER — FLUTICASONE PROPIONATE 50 MCG
1 SPRAY, SUSPENSION (ML) NASAL DAILY
Qty: 1 BOTTLE | Refills: 3 | Status: CANCELLED | OUTPATIENT
Start: 2019-01-01

## 2019-01-01 RX ORDER — CETIRIZINE HYDROCHLORIDE 10 MG/1
10 TABLET ORAL DAILY
Status: DISCONTINUED | OUTPATIENT
Start: 2019-01-01 | End: 2019-01-01 | Stop reason: HOSPADM

## 2019-01-01 RX ORDER — PIOGLITAZONEHYDROCHLORIDE 15 MG/1
15 TABLET ORAL DAILY
Qty: 30 TABLET | Refills: 3 | Status: SHIPPED | OUTPATIENT
Start: 2019-01-01

## 2019-01-01 RX ORDER — SODIUM CHLORIDE 0.9 % (FLUSH) 0.9 %
10 SYRINGE (ML) INJECTION EVERY 12 HOURS SCHEDULED
Status: DISCONTINUED | OUTPATIENT
Start: 2019-01-01 | End: 2019-01-01 | Stop reason: HOSPADM

## 2019-01-01 RX ORDER — FUROSEMIDE 10 MG/ML
20 INJECTION INTRAMUSCULAR; INTRAVENOUS ONCE
Status: COMPLETED | OUTPATIENT
Start: 2019-01-01 | End: 2019-01-01

## 2019-01-01 RX ORDER — MODAFINIL 200 MG/1
200 TABLET ORAL DAILY
Status: DISCONTINUED | OUTPATIENT
Start: 2019-01-01 | End: 2019-01-01 | Stop reason: HOSPADM

## 2019-01-01 RX ORDER — SODIUM CHLORIDE 0.9 % (FLUSH) 0.9 %
20 SYRINGE (ML) INJECTION PRN
Status: DISCONTINUED | OUTPATIENT
Start: 2019-01-01 | End: 2019-01-01 | Stop reason: HOSPADM

## 2019-01-01 RX ORDER — ACYCLOVIR 200 MG/1
400 CAPSULE ORAL 2 TIMES DAILY
Qty: 40 CAPSULE | Refills: 0 | Status: CANCELLED | OUTPATIENT
Start: 2019-01-01 | End: 2019-01-01

## 2019-01-01 RX ORDER — OXYCODONE HYDROCHLORIDE AND ACETAMINOPHEN 325; 5 MG/5ML; MG/5ML
5 SOLUTION ORAL EVERY 4 HOURS PRN
COMMUNITY

## 2019-01-01 RX ORDER — CLONIDINE HYDROCHLORIDE 0.1 MG/1
0.1 TABLET ORAL NIGHTLY
Qty: 60 TABLET | Refills: 3 | Status: CANCELLED | OUTPATIENT
Start: 2019-01-01

## 2019-01-01 RX ORDER — 0.9 % SODIUM CHLORIDE 0.9 %
500 INTRAVENOUS SOLUTION INTRAVENOUS ONCE
Status: COMPLETED | OUTPATIENT
Start: 2019-01-01 | End: 2019-01-01

## 2019-01-01 RX ORDER — AZITHROMYCIN 250 MG/1
250 TABLET, FILM COATED ORAL SEE ADMIN INSTRUCTIONS
Qty: 6 TABLET | Refills: 0 | Status: SHIPPED | OUTPATIENT
Start: 2019-01-01 | End: 2019-01-01

## 2019-01-01 RX ORDER — POTASSIUM CHLORIDE 20 MEQ/1
40 TABLET, EXTENDED RELEASE ORAL PRN
Status: DISCONTINUED | OUTPATIENT
Start: 2019-01-01 | End: 2019-01-01 | Stop reason: HOSPADM

## 2019-01-01 RX ORDER — CLONIDINE HYDROCHLORIDE 0.1 MG/1
0.1 TABLET ORAL NIGHTLY
Qty: 60 TABLET | Refills: 3 | Status: SHIPPED | OUTPATIENT
Start: 2019-01-01

## 2019-01-01 RX ORDER — FLUTICASONE PROPIONATE 50 MCG
1 SPRAY, SUSPENSION (ML) NASAL DAILY
Status: DISCONTINUED | OUTPATIENT
Start: 2019-01-01 | End: 2019-01-01 | Stop reason: HOSPADM

## 2019-01-01 RX ORDER — 0.9 % SODIUM CHLORIDE 0.9 %
250 INTRAVENOUS SOLUTION INTRAVENOUS ONCE
Status: COMPLETED | OUTPATIENT
Start: 2019-01-01 | End: 2019-01-01

## 2019-01-01 RX ORDER — AZITHROMYCIN 250 MG/1
250 TABLET, FILM COATED ORAL DAILY
Qty: 10 TABLET | Refills: 0 | Status: SHIPPED | OUTPATIENT
Start: 2019-01-01 | End: 2019-01-01

## 2019-01-01 RX ORDER — BUDESONIDE 0.5 MG/2ML
0.5 INHALANT ORAL 2 TIMES DAILY
Status: DISCONTINUED | OUTPATIENT
Start: 2019-01-01 | End: 2019-01-01

## 2019-01-01 RX ORDER — CLARITHROMYCIN 500 MG/1
500 TABLET, COATED ORAL 2 TIMES DAILY
Qty: 14 TABLET | Refills: 0 | Status: SHIPPED | OUTPATIENT
Start: 2019-01-01 | End: 2019-01-01

## 2019-01-01 RX ORDER — ACYCLOVIR 200 MG/1
800 CAPSULE ORAL 2 TIMES DAILY
Status: DISCONTINUED | OUTPATIENT
Start: 2019-01-01 | End: 2019-01-01

## 2019-01-01 RX ORDER — PREDNISONE 10 MG/1
TABLET ORAL
Qty: 100 TABLET | Refills: 5 | Status: SHIPPED | OUTPATIENT
Start: 2019-01-01

## 2019-01-01 RX ORDER — GABAPENTIN 300 MG/1
600 CAPSULE ORAL NIGHTLY
Status: DISCONTINUED | OUTPATIENT
Start: 2019-01-01 | End: 2019-01-01 | Stop reason: HOSPADM

## 2019-01-01 RX ORDER — SODIUM CHLORIDE 9 MG/ML
500 INJECTION, SOLUTION INTRAVENOUS CONTINUOUS
Status: DISCONTINUED | OUTPATIENT
Start: 2019-01-01 | End: 2019-01-01

## 2019-01-01 RX ORDER — MODAFINIL 200 MG/1
200 TABLET ORAL DAILY
Qty: 30 TABLET | Refills: 3 | Status: CANCELLED | OUTPATIENT
Start: 2019-01-01 | End: 2019-01-01

## 2019-01-01 RX ORDER — FORMOTEROL FUMARATE 20 UG/2ML
20 SOLUTION RESPIRATORY (INHALATION) 2 TIMES DAILY PRN
Status: DISCONTINUED | OUTPATIENT
Start: 2019-01-01 | End: 2019-01-01 | Stop reason: HOSPADM

## 2019-01-01 RX ORDER — LANOLIN ALCOHOL/MO/W.PET/CERES
400 CREAM (GRAM) TOPICAL DAILY
Status: DISCONTINUED | OUTPATIENT
Start: 2019-01-01 | End: 2019-01-01 | Stop reason: HOSPADM

## 2019-01-01 RX ORDER — POTASSIUM CHLORIDE 1.5 G/1.77G
40 POWDER, FOR SOLUTION ORAL PRN
Status: DISCONTINUED | OUTPATIENT
Start: 2019-01-01 | End: 2019-01-01 | Stop reason: HOSPADM

## 2019-01-01 RX ORDER — MAGNESIUM SULFATE 1 G/100ML
1 INJECTION INTRAVENOUS PRN
Status: DISCONTINUED | OUTPATIENT
Start: 2019-01-01 | End: 2019-01-01 | Stop reason: HOSPADM

## 2019-01-01 RX ORDER — CLONAZEPAM 0.5 MG/1
0.5 TABLET ORAL 2 TIMES DAILY
Status: DISCONTINUED | OUTPATIENT
Start: 2019-01-01 | End: 2019-01-01 | Stop reason: HOSPADM

## 2019-01-01 RX ORDER — PIOGLITAZONEHYDROCHLORIDE 15 MG/1
15 TABLET ORAL ONCE
Status: COMPLETED | OUTPATIENT
Start: 2019-01-01 | End: 2019-01-01

## 2019-01-01 RX ORDER — FLUCONAZOLE 200 MG/1
200 TABLET ORAL DAILY
Status: DISCONTINUED | OUTPATIENT
Start: 2019-01-01 | End: 2019-01-01 | Stop reason: HOSPADM

## 2019-01-01 RX ORDER — MAGNESIUM SULFATE IN WATER 40 MG/ML
4 INJECTION, SOLUTION INTRAVENOUS PRN
Status: DISCONTINUED | OUTPATIENT
Start: 2019-01-01 | End: 2019-01-01

## 2019-01-01 RX ORDER — ALBUTEROL SULFATE 2.5 MG/3ML
2.5 SOLUTION RESPIRATORY (INHALATION) EVERY 4 HOURS PRN
Qty: 120 EACH | Refills: 3 | Status: SHIPPED | OUTPATIENT
Start: 2019-01-01

## 2019-01-01 RX ORDER — FORMOTEROL FUMARATE 20 UG/2ML
20 SOLUTION RESPIRATORY (INHALATION) 2 TIMES DAILY
Status: DISCONTINUED | OUTPATIENT
Start: 2019-01-01 | End: 2019-01-01

## 2019-01-01 RX ORDER — LANOLIN ALCOHOL/MO/W.PET/CERES
2500 CREAM (GRAM) TOPICAL DAILY
Status: DISCONTINUED | OUTPATIENT
Start: 2019-01-01 | End: 2019-01-01 | Stop reason: HOSPADM

## 2019-01-01 RX ADMIN — ACETAMINOPHEN 650 MG: 325 TABLET ORAL at 14:07

## 2019-01-01 RX ADMIN — CLONAZEPAM 0.5 MG: 0.5 TABLET ORAL at 08:23

## 2019-01-01 RX ADMIN — MEROPENEM 1 G: 1 INJECTION, POWDER, FOR SOLUTION INTRAVENOUS at 19:40

## 2019-01-01 RX ADMIN — FOLIC ACID TAB 400 MCG 400 MCG: 400 TAB at 08:31

## 2019-01-01 RX ADMIN — FLUCONAZOLE 200 MG: 200 TABLET ORAL at 08:24

## 2019-01-01 RX ADMIN — MEROPENEM 1 G: 1 INJECTION, POWDER, FOR SOLUTION INTRAVENOUS at 07:18

## 2019-01-01 RX ADMIN — ACYCLOVIR 400 MG: 200 CAPSULE ORAL at 08:29

## 2019-01-01 RX ADMIN — CETIRIZINE HYDROCHLORIDE 10 MG: 10 TABLET, FILM COATED ORAL at 08:30

## 2019-01-01 RX ADMIN — Medication 10 ML: at 21:42

## 2019-01-01 RX ADMIN — SODIUM CHLORIDE 1000 ML: 0.9 INJECTION, SOLUTION INTRAVENOUS at 23:07

## 2019-01-01 RX ADMIN — CLONAZEPAM 0.5 MG: 0.5 TABLET ORAL at 08:29

## 2019-01-01 RX ADMIN — FLUCONAZOLE 200 MG: 200 TABLET ORAL at 21:40

## 2019-01-01 RX ADMIN — SODIUM CHLORIDE 15 ML: 900 IRRIGANT IRRIGATION at 21:41

## 2019-01-01 RX ADMIN — SODIUM CHLORIDE 500 ML: 9 INJECTION, SOLUTION INTRAVENOUS at 19:22

## 2019-01-01 RX ADMIN — GABAPENTIN 600 MG: 300 CAPSULE ORAL at 21:21

## 2019-01-01 RX ADMIN — CYANOCOBALAMIN TAB 1000 MCG 2500 MCG: 1000 TAB at 08:23

## 2019-01-01 RX ADMIN — ACETAMINOPHEN 650 MG: 325 TABLET ORAL at 12:36

## 2019-01-01 RX ADMIN — ACETAMINOPHEN 650 MG: 325 TABLET ORAL at 13:52

## 2019-01-01 RX ADMIN — MODAFINIL 200 MG: 200 TABLET ORAL at 10:10

## 2019-01-01 RX ADMIN — Medication 10 ML: at 08:24

## 2019-01-01 RX ADMIN — CLONIDINE HYDROCHLORIDE 0.1 MG: 0.1 TABLET ORAL at 21:21

## 2019-01-01 RX ADMIN — SODIUM CHLORIDE 500 ML: 9 INJECTION, SOLUTION INTRAVENOUS at 12:37

## 2019-01-01 RX ADMIN — ACYCLOVIR 400 MG: 200 CAPSULE ORAL at 08:24

## 2019-01-01 RX ADMIN — ACYCLOVIR 400 MG: 200 CAPSULE ORAL at 21:21

## 2019-01-01 RX ADMIN — ACETAMINOPHEN 650 MG: 325 TABLET, FILM COATED ORAL at 12:32

## 2019-01-01 RX ADMIN — ACETAMINOPHEN 650 MG: 325 TABLET ORAL at 13:00

## 2019-01-01 RX ADMIN — Medication 10 ML: at 21:22

## 2019-01-01 RX ADMIN — MEROPENEM 1 G: 1 INJECTION, POWDER, FOR SOLUTION INTRAVENOUS at 01:00

## 2019-01-01 RX ADMIN — Medication 10 ML: at 08:31

## 2019-01-01 RX ADMIN — INSULIN HUMAN 5 UNITS: 100 INJECTION, SOLUTION PARENTERAL at 21:09

## 2019-01-01 RX ADMIN — CETIRIZINE HYDROCHLORIDE 10 MG: 10 TABLET, FILM COATED ORAL at 08:24

## 2019-01-01 RX ADMIN — ISOSORBIDE MONONITRATE 30 MG: 30 TABLET, EXTENDED RELEASE ORAL at 21:21

## 2019-01-01 RX ADMIN — SODIUM CHLORIDE: 9 INJECTION, SOLUTION INTRAVENOUS at 12:25

## 2019-01-01 RX ADMIN — ACETAMINOPHEN 650 MG: 325 TABLET ORAL at 12:26

## 2019-01-01 RX ADMIN — ISOSORBIDE MONONITRATE 30 MG: 30 TABLET, EXTENDED RELEASE ORAL at 08:23

## 2019-01-01 RX ADMIN — FOLIC ACID TAB 400 MCG 400 MCG: 400 TAB at 10:10

## 2019-01-01 RX ADMIN — CYANOCOBALAMIN TAB 1000 MCG 2500 MCG: 1000 TAB at 08:29

## 2019-01-01 RX ADMIN — CLONIDINE HYDROCHLORIDE 0.1 MG: 0.1 TABLET ORAL at 21:42

## 2019-01-01 RX ADMIN — MODAFINIL 200 MG: 200 TABLET ORAL at 10:52

## 2019-01-01 RX ADMIN — INSULIN HUMAN 10 UNITS: 100 INJECTION, SOLUTION PARENTERAL at 21:08

## 2019-01-01 RX ADMIN — ACETAMINOPHEN 650 MG: 325 TABLET ORAL at 15:26

## 2019-01-01 RX ADMIN — PIOGLITAZONE 15 MG: 15 TABLET ORAL at 01:21

## 2019-01-01 RX ADMIN — ISOSORBIDE MONONITRATE 30 MG: 30 TABLET, EXTENDED RELEASE ORAL at 08:30

## 2019-01-01 RX ADMIN — CLONAZEPAM 0.5 MG: 0.5 TABLET ORAL at 21:21

## 2019-01-01 RX ADMIN — SODIUM CHLORIDE 250 ML: 9 INJECTION, SOLUTION INTRAVENOUS at 06:10

## 2019-01-01 RX ADMIN — ACETAMINOPHEN 650 MG: 325 TABLET ORAL at 13:59

## 2019-01-01 RX ADMIN — ACETAMINOPHEN 650 MG: 325 TABLET ORAL at 12:49

## 2019-01-01 RX ADMIN — MEROPENEM 1 G: 1 INJECTION, POWDER, FOR SOLUTION INTRAVENOUS at 05:42

## 2019-01-01 RX ADMIN — FLUCONAZOLE 200 MG: 200 TABLET ORAL at 08:30

## 2019-01-01 RX ADMIN — TBO-FILGRASTIM 300 MCG: 300 INJECTION, SOLUTION SUBCUTANEOUS at 17:50

## 2019-01-01 RX ADMIN — FUROSEMIDE 20 MG: 10 INJECTION, SOLUTION INTRAMUSCULAR; INTRAVENOUS at 14:36

## 2019-01-01 RX ADMIN — POTASSIUM CHLORIDE 40 MEQ: 20 TABLET, EXTENDED RELEASE ORAL at 06:46

## 2019-01-01 RX ADMIN — SODIUM CHLORIDE 500 ML: 9 INJECTION, SOLUTION INTRAVENOUS at 00:52

## 2019-01-01 RX ADMIN — SODIUM CHLORIDE: 9 INJECTION, SOLUTION INTRAVENOUS at 21:41

## 2019-01-01 RX ADMIN — ACYCLOVIR 400 MG: 200 CAPSULE ORAL at 21:41

## 2019-01-01 RX ADMIN — GABAPENTIN 600 MG: 300 CAPSULE ORAL at 23:27

## 2019-01-01 RX ADMIN — ACETAMINOPHEN 650 MG: 325 TABLET, FILM COATED ORAL at 10:57

## 2019-01-01 RX ADMIN — CLONAZEPAM 0.5 MG: 0.5 TABLET ORAL at 21:42

## 2019-01-01 RX ADMIN — ISOSORBIDE MONONITRATE 30 MG: 30 TABLET, EXTENDED RELEASE ORAL at 23:27

## 2019-01-01 ASSESSMENT — PAIN DESCRIPTION - DESCRIPTORS: DESCRIPTORS: SHARP

## 2019-01-01 ASSESSMENT — PAIN SCALES - GENERAL
PAINLEVEL_OUTOF10: 0
PAINLEVEL_OUTOF10: 9
PAINLEVEL_OUTOF10: 0
PAINLEVEL_OUTOF10: 9
PAINLEVEL_OUTOF10: 0

## 2019-01-01 ASSESSMENT — PAIN DESCRIPTION - FREQUENCY: FREQUENCY: CONTINUOUS

## 2019-01-01 ASSESSMENT — ENCOUNTER SYMPTOMS
RESPIRATORY NEGATIVE: 1
ABDOMINAL DISTENTION: 0
ABDOMINAL PAIN: 1
EYES NEGATIVE: 1

## 2019-01-01 ASSESSMENT — PAIN DESCRIPTION - PAIN TYPE: TYPE: ACUTE PAIN

## 2019-01-01 ASSESSMENT — PAIN DESCRIPTION - LOCATION: LOCATION: ABDOMEN

## 2019-01-01 ASSESSMENT — PAIN DESCRIPTION - ONSET: ONSET: PROGRESSIVE

## 2019-01-01 ASSESSMENT — PAIN - FUNCTIONAL ASSESSMENT: PAIN_FUNCTIONAL_ASSESSMENT: PREVENTS OR INTERFERES WITH MANY ACTIVE NOT PASSIVE ACTIVITIES

## 2019-01-01 ASSESSMENT — PAIN DESCRIPTION - ORIENTATION: ORIENTATION: RIGHT;LEFT;LOWER

## 2019-01-01 ASSESSMENT — PAIN DESCRIPTION - PROGRESSION: CLINICAL_PROGRESSION: GRADUALLY WORSENING

## 2019-01-22 NOTE — PROGRESS NOTES
Patient's hemoglobin this AM: 8.2 g/dL. Patient's platelet count this AM: 14.0 k/uL. Pt seen and assessed at [facility]. Seen at [address] [facility] today for platelet transfusion per standing orders for above lab values. Blood products transfused per [facility] policy. Pt tolerated transfusion well and without incident. Pt verbalizes understanding of discharge instructions. Discharged ambulatory to [facility] for further treatment; unaccompanied. Patient decline wheelchair transportation.

## 2019-02-04 NOTE — PROGRESS NOTES
Patient arrived at [address] [place] [address] for type and screen; will receive blood transfusion tomorrow. Type and screen drawn peripherally, and patient discharged ambulatory to home with wife. Will return tomorrow, and agreeable to plan.

## 2019-02-14 NOTE — PROGRESS NOTES
Problem: KNOWLEDGE DEFICIT  Goal: Patient/S.O. demonstrates understanding of disease process, treatment plan, medications, and discharge instructions. Outcome: Ongoing  Patient's hemoglobin this AM: 7.1  Patient's platelet count this AM: 12  Pt seen and assessed at [facility]. Seen at [facility] today for 1 unit PRBC transfusion and 1 unit Platelet transfusion per standing orders for above lab values per [facility], pt symptomatic with shortness of breath and fatigue. Blood products transfused per [facility] policy. Pt tolerated transfusion well and without incident. Pt verbalizes understanding of discharge instructions.   Discharged ambulatory to home per self.

## 2019-02-21 NOTE — PLAN OF CARE
Problem: KNOWLEDGE DEFICIT  Goal: Patient/S.O. demonstrates understanding of disease process, treatment plan, medications, and discharge instructions. Outcome: Ongoing  Patient's hemoglobin this AM: 8.5  Patient's platelet count this AM:5   Pt seen and assessed at [facility]. Seen at [address] [place] [address] today for 1 unit of SDP per standing orders for above lab values, no s/s of bleeding noted or reported by pt. Blood products transfused per [facility] policy. Pt tolerated transfusion well and without incident. Bleeding precautions reviewed with pt, pt verbalizes understanding of discharge instructions. Discharged ambulatory to home.

## 2019-02-24 NOTE — PROGRESS NOTES
Patient here from [facility] for platelets. Alert, oriented, vss. He states he went to [facility] today because he felt faint and almost passed out at home. While putting in IV today he felt like he was going to pass out and things were dark. BP 72 systolic, NS bolus started and Venita Emerson, nurse navigator at [facility] notified. Patient receiving 500 ml bolus at this time and BP recovered, 102/68. Will continue to monitor.

## 2019-02-28 NOTE — PLAN OF CARE
Problem: Bleeding:  Goal: Will show no signs and symptoms of excessive bleeding  Will show no signs and symptoms of excessive bleeding   Outcome: Met This Shift  Patient's hemoglobin this AM:  7.5  Patient's platelet count this AM: 7.0  Pt seen and assessed at [facility]. Seen at [address] [place] [address] today for Platelet transfusion per standing orders for above lab values. Blood products transfused per [facility] policy. Pt tolerated transfusion well and without incident. Pt verbalizes understanding of discharge instructions. Discharged via wheelchair to home with wife.

## 2019-02-28 NOTE — PLAN OF CARE
Problem: Falls - Risk of:  Goal: Will remain free from falls  Will remain free from falls   Outcome: Met This Shift  Pt is a medium fall risk. Explained fall risk precautions to pt and rationale behind their use to keep the patient safe. Belongings are in reach. Pt encouraged to notify staff for any and all assistance. Staff present in tx suite throughout entirety of pts treatment to monitor and protect from falls. Assistance provided when ambulating to restroom utilizing Stay With Me.

## 2019-03-04 PROBLEM — D70.9 NEUTROPENIC FEVER (HCC): Status: ACTIVE | Noted: 2019-01-01

## 2019-03-04 PROBLEM — R50.81 NEUTROPENIC FEVER (HCC): Status: ACTIVE | Noted: 2019-01-01

## 2019-03-29 NOTE — TELEPHONE ENCOUNTER
His recent chest xray showed no new changes. Unless he has change in cough (becoming more productive, fever, or shortness of breath, and chest xray has new findings, bronchoscopy and sputum specimens not likely to be helpful.   We can discuss this more on 4/8

## 2019-04-01 NOTE — TELEPHONE ENCOUNTER
Prescription for azithromycin is reasonable. I will send this to his pharmacy.   Give him an appointment for later this week, Friday morning would work

## 2019-04-01 NOTE — TELEPHONE ENCOUNTER
Spoke with patient. His cough is more productive of yellowish-brownish sputum; he is more SOB, more lethargic. Do you want to see him sooner? He says he is \"fighting infection\".

## 2019-04-05 NOTE — PROGRESS NOTES
Subjective:      Patient ID: Nam Vazquez is a 59 y.o. male. HPI  Mr. Marysol Medina returns for ongoing issues with bronchiolitis and asthma. Throughout much of the month of March, he was dealing with apparent lower respiratory infection. He did not have infiltrates indicative of pneumonia. However he had continued cough and very on sputum. He was hospitalized for several days, had continued symptoms and was given a course of ertapenem(?) as an outpatient. He had a rash, apparently in response to this. He had continued cough and was treated with 2 courses of azithromycin. 3 days ago, he started prednisone 40 mg daily. In the 3 days since, cough and sputum have decreased significantly. He is breathing fairly comfortably. He continues to have marked sensitivity to orders related to various fumes. As an example, he walked into the office building this morning and noted a strong odor of floor polish that had been applied yesterday. He placed his mask on, to fill to that out and was able to continue walking. Because of neutropenia and thrombocytopenia, he hasn't been to the pool to go swimming but he has been out walking more. His exercise tolerance is at least stable. Review of Systems    Objective:   Physical Exam   Constitutional: He is oriented to person, place, and time. He appears well-developed and well-nourished. HENT:   Head: Normocephalic and atraumatic. Mouth/Throat: Oropharynx is clear and moist. No oropharyngeal exudate. Eyes: Conjunctivae are normal. Right eye exhibits no discharge. No scleral icterus. Neck: No tracheal deviation present. No thyromegaly present. Cardiovascular: Normal rate, regular rhythm, normal heart sounds and intact distal pulses. No murmur heard. Pulmonary/Chest: Effort normal. He has no decreased breath sounds. He has no wheezes. He has no rhonchi. He has no rales. Deep breath causes nonproductive cough. Musculoskeletal: He exhibits no edema. Lymphadenopathy:     He has no cervical adenopathy. Neurological: He is alert and oriented to person, place, and time. Skin: Skin is warm and dry. Psychiatric: He has a normal mood and affect. His behavior is normal. Judgment and thought content normal.       Assessment:      Persistent bronchitis, unresponsive to multiple antibiotic therapies, improving with prednisone 40 mg daily. This is consistent with bronchiolitis or asthma, with exacerbation resulting in increased airways inflammation. Plan:      Continue prednisone 40 mg for a one-week course, then dropped to 30 mg for one week and then 20 mg daily. At about that time, he will be leaving the country for [place]. He will take enough prednisone to maintain at least that dosing plus some extra. It is possible that while overseas he may be able to further reduce the dose of prednisone, but he may require extra bursts. Multiple myeloma, currently off treatment. His myeloma has been in remission with the current therapy, but he has found the toxicity to be unbearable.         Amol Monique MD

## 2019-04-09 NOTE — TELEPHONE ENCOUNTER
Pt stated he was in last Friday to see Dr. Eliz Whitaker with cold symptoms. He thought his symptoms was clearing up but now he is having sinus symptoms. Coughing up yellow \"yuck\" more coming from the nose. Pt stated he know this is more sinus than anything and wants to get this under control before going on vacation. He stated he not having any other symptoms at this time. He use [facility]. Please advise?

## 2019-04-10 NOTE — TELEPHONE ENCOUNTER
Patient called back upset that he did not hear from Dr Diya Arguelles nor was there a antibiotic called to [facility]. He is very worried that his sinuses are going to become worse before leaving on vacation. Please send Rx or call pt.   Thank you

## 2019-04-11 NOTE — TELEPHONE ENCOUNTER
Spoke with Mr. Brenda Ng. He continues to have yellow drainage from his nose and sinuses. No fevers or chills, no headache. Respiratory status is stable, no productive cough. Prescribing azithromycin 250 mg daily ×7 days.   Advised him also to use a saline rinse to clear secretions from his nose and sinuses

## 2019-04-12 NOTE — PLAN OF CARE
Problem: Bleeding:  Goal: Will show no signs and symptoms of excessive bleeding  Description  Will show no signs and symptoms of excessive bleeding   Outcome: Ongoing  Note:   Patient's hemoglobin this AM: 10.5  Patient's platelet count this AM: 17  Pt seen and assessed at [facility]. Seen at [address] [place] [address] today for 1 unit Platelet transfusion per standing orders for above lab values per [facility]. Blood products transfused per [facility] policy. Pt tolerated transfusion well and without incident. Pt verbalizes understanding of discharge instructions. Discharged ambulatory using cane to home per self.

## 2019-05-21 NOTE — PLAN OF CARE
Problem: Bleeding:  Goal: Will show no signs and symptoms of excessive bleeding  Outcome: Met This Shift  Note:   Pt platelet count 12. Pt received platelets which he tolerated well. Pt educated/reminded about bleeding precautions and post transfusion d/c instructions. Pt verbalizes understanding. No signs of active bleeding this shift. D/C'd ambulatory. Problem: Falls - Risk of:  Goal: Absence of physical injury  Note:   Pt is a fall risk. Explained fall risk precautions to pt and rationale behind their use to keep the patient safe. Belongings are in reach. Pt encouraged to notify staff for any and all assistance. Staff present in tx suite throughout entirety of pts treatment to monitor and protect from falls. Assistance provided when ambulating to restroom utilizing Stay With Me.

## 2019-05-29 NOTE — PLAN OF CARE
Problem: Falls - Risk of:  Goal: Will remain free from falls  Description  Will remain free from falls   Outcome: Ongoing  Note:   Pt is a medium fall risk. Explained fall risk precautions to pt & wife and rationale behind their use to keep the patient safe. Belongings are in reach. Pt encouraged to notify staff for any and all assistance. Staff present in tx suite throughout entirety of pts treatment to monitor and protect from falls. Assistance provided when ambulating to restroom utilizing Stay With Me. Problem: Bleeding:  Goal: Will show no signs and symptoms of excessive bleeding  Description  Will show no signs and symptoms of excessive bleeding   Outcome: Ongoing  Note:   Patient's hemoglobin this AM: 11.4  Patient's platelet count this AM: 10  Pt seen and assessed at [facility]. Seen at [address] [place] [address] today for Platelet transfusion per standing orders for above lab values. Blood products transfused per [facility] policy. Pt tolerated transfusion well and without incident. Pt verbalizes understanding of discharge instructions. Discharged ambulatory to home.

## 2019-06-03 NOTE — PROGRESS NOTES
Subjective:      Patient ID: Hernesto Temple is a 59 y.o. male. HPI  Mr. Rohit Paige is seen after his return from vacation in [place]. He had a good trip, took a lot of pictures. He was able to get around with use of a scooter. He found a very small portable CPAP machine that he was able to utilize during breaks during the day as well as for sleep at night. His big complaints at this point are that he is not sleeping well, he has gained a lot of edema, which is not clearing well with diuretic, and generally isn't feeling terribly well. He is not complaining of shortness of breath or significant cough, unless he encounters environmental challenges such as perfumes. He has been on high doses of Decadron, which enabled him to travel. He is down to 12 mg daily now. Sleep has been very difficult. Review of Systems    Objective:   Physical Exam   Constitutional: He is oriented to person, place, and time. He appears well-developed and well-nourished. HENT:   Head: Normocephalic and atraumatic. Mouth/Throat: Oropharynx is clear and moist. No oropharyngeal exudate. Eyes: Conjunctivae are normal. Right eye exhibits no discharge. No scleral icterus. Neck: No tracheal deviation present. No thyromegaly present. Cardiovascular: Regular rhythm, normal heart sounds and intact distal pulses. Tachycardia present. Pulmonary/Chest: Effort normal and breath sounds normal. No stridor. No respiratory distress. He has no wheezes. He has no rales. He exhibits no tenderness. Musculoskeletal: He exhibits edema. Bilateral 3+ pretibial and pedal edema   Lymphadenopathy:     He has no cervical adenopathy. Neurological: He is alert and oriented to person, place, and time. Skin: Skin is warm and dry. Psychiatric: He has a normal mood and affect.  His behavior is normal. Judgment and thought content normal.       Assessment:      Bronchiolitis remains under control with inhaled steroid and long-acting beta agonist inhaler,

## 2019-06-10 NOTE — PLAN OF CARE
Problem: Falls - Risk of:  Goal: Will remain free from falls  Description  Will remain free from falls   Outcome: Ongoing  Note:   Pt is a  fall risk. Explained fall risk precautions to pt and rationale behind their use to keep the patient safe. Belongings are in reach. Pt encouraged to notify staff for any and all assistance. Staff present in tx suite throughout entirety of pts treatment to monitor and protect from falls. Assistance provided when ambulating to restroom utilizing Stay With Me. Problem: Bleeding:  Goal: Will show no signs and symptoms of excessive bleeding  Description  Will show no signs and symptoms of excessive bleeding   Outcome: Ongoing  Note:   Patient's hemoglobin this AM: 11.0  Patient's platelet count this AM: 16.0  Pt seen and assessed at [facility]. Seen at [address] [place] [address] today for Platelet transfusion per standing orders for above lab values. Blood products transfused per [facility] policy. Pt tolerated transfusion well and without incident. Pt verbalizes understanding of discharge instructions. Discharged ambulatory to home.

## 2019-06-19 NOTE — PLAN OF CARE
Problem: Bleeding:  Goal: Will show no signs and symptoms of excessive bleeding  Description  Will show no signs and symptoms of excessive bleeding   Outcome: Ongoing  Note:   Patient's hemoglobin this AM: 8.9  Patient's platelet count this AM: 12  Pt seen and assessed at [facility]. Seen at [address] [place] [address] today for 1 unit Platelet transfusion per standing orders for above lab values per [facility]. Blood products transfused per [facility] policy. Pt tolerated transfusion well and without incident. Pt verbalizes understanding of discharge instructions. Discharged ambulatory using cane to home per self. Purse String (Intermediate) Text: Given the location of the defect and the characteristics of the surrounding skin a pursestring intermediate closure was deemed most appropriate.  Undermining was performed circumfirentially around the surgical defect.  A purstring suture was then placed and tightened.

## 2019-06-25 NOTE — ED NOTES
Arabella Hernandez with Lab called with a critical value of platelets of 30.  Will notify MD. Nathalie Rivas RN  06/25/19 2113

## 2019-06-25 NOTE — ED PROVIDER NOTES
[address] [facility] ENCOUNTER          ATTENDING PHYSICIAN NOTE       Date of evaluation: 6/25/2019    Chief Complaint     No chief complaint on file. History of Present Illness     Velia Melendez is a 59 y.o. male who has a history of refractory multiple myeloma presents with elevated blood sugar. Patient has been on Decadron he believes this is what is causing his blood sugar to be elevated. He is also had some diffuse abdominal pain with no nausea vomiting or fevers. Review of Systems     Review of Systems   Constitutional: Positive for activity change and fatigue. HENT: Negative. Eyes: Negative. Respiratory: Negative. Cardiovascular: Negative. Gastrointestinal: Positive for abdominal pain. Negative for abdominal distention. Genitourinary: Negative. Musculoskeletal: Negative. Neurological: Negative. Psychiatric/Behavioral: Negative. Past Medical, Surgical, Family, and Social History     He has a past medical history of Acute renal disease, Anxiety, Arthritis, Bronchiolitis, Cough, CRI (chronic renal insufficiency), Depression, Fibromyalgia, GERD (gastroesophageal reflux disease), Hyperlipidemia, Hypertension, Multiple myeloma in remission ([address] Plasmacytic myeloma ([address] PONV (postoperative nausea and vomiting), Posterior tibial tendinitis of left leg, Posterior tibial tendinitis of right leg, Skin lesion, Sleep apnea, Stem cells transplant status ([address] and Thrombocytopenia ([address] He has a past surgical history that includes Kidney biopsy; Fixation Kyphoplasty (01/2017); Fixation Kyphoplasty (03/02/2018); and Tunneled venous catheter placement (Left, 10/17/14). His family history includes Breast Cancer in his mother; Coronary Art Dis in his father; Hearing Loss in his father; High Cholesterol in his father; Hypertension in his father. He reports that he has never smoked.  He has never used smokeless tobacco. He reports that he does not drink alcohol or use drugs. Medications     Previous Medications    ACETAMINOPHEN (TYLENOL) 325 MG TABLET    Take 650 mg by mouth every 6 hours as needed for Pain    ACYCLOVIR (ZOVIRAX) 800 MG TABLET    Take 1 tablet by mouth daily    ALBUTEROL (PROVENTIL) (2.5 MG/3ML) 0.083% NEBULIZER SOLUTION    Take 3 mLs by nebulization every 4 hours as needed for Wheezing    BUPROPION (WELLBUTRIN) 100 MG TABLET    Take 2 tablets by mouth 2 times daily    CALCIUM CARB-CHOLECALCIFEROL (CALCIUM + D3) 600-200 MG-UNIT TABS TABLET    Take 1 tablet by mouth Daily with lunch    CETIRIZINE (ZYRTEC) 10 MG TABLET    Take 10 mg by mouth daily     CLONAZEPAM (KLONOPIN) 0.5 MG TABLET    Take 2-4 tablets by mouth nightly as needed (anxiety / sleep) for up to 90 days. Stacey Menard CLONIDINE (CATAPRES) 0.1 MG TABLET    Take 1 tablet by mouth nightly    CYANOCOBALAMIN (B-12) 2500 MCG TABS    Take by mouth    DIPHENHYDRAMINE-APAP (PERCOGESIC) 12.5-325 MG TABS    Take by mouth as needed (Pain)    FLUOCINONIDE (LIDEX) 0.05 % CREAM    Apply 1 applicator topically 2 times daily as needed (rash) Apply topically 2 times daily. FLUTICASONE-SALMETEROL (ADVAIR HFA) 230-21 MCG/ACT INHALER    Inhale 2 puffs into the lungs 2 times daily    FOLIC ACID (FOLVITE) 310 MCG TABLET    Take 400 mcg by mouth daily    FUROSEMIDE PO    Take 60 mg by mouth daily     GABAPENTIN (NEURONTIN) 100 MG CAPSULE    Take 2 capsules by mouth daily for 28 days. Stacey Menard GABAPENTIN (NEURONTIN) 300 MG CAPSULE    Take 2 capsules by mouth 2 times daily for 28 days. Stacey Menard GABAPENTIN (NEURONTIN) 300 MG CAPSULE    Take 1 capsule by mouth daily for 28 days. .    ISOSORBIDE MONONITRATE (IMDUR) 30 MG EXTENDED RELEASE TABLET    Take 1 tablet by mouth 2 times daily    LANSOPRAZOLE (PREVACID 24HR) 15 MG DELAYED RELEASE CAPSULE    Take 15 mg by mouth daily    LIDOCAINE (LIDODERM) 5 %    Place 1 patch onto the skin daily as needed for Pain 12 hours on, 12 hours off.     LOPERAMIDE (IMODIUM) 2 MG CAPSULE    Take 1 person, place, and time. Skin: Skin is warm and dry. Psychiatric: He has a normal mood and affect. His behavior is normal. Judgment and thought content normal.   Nursing note and vitals reviewed. Diagnostic Results         RADIOLOGY:  No orders to display       LABS:   No results found for this visit on 06/25/19. RECENT VITALS:   , ,  ,  ,       Procedures         ED Course     Nursing Notes, Past Medical Hx, Past Surgical Hx, Social Hx,Allergies, and Family Hx were reviewed. The patient was given the following medications:  No orders of the defined types were placed in this encounter. CONSULTS:  None    MEDICAL DECISIONMAKING / ASSESSMENT / PLAN     Mesfin Mcadams is a 59 y.o. male with a history of refractory multiple myeloma who is currently in hospice is not had any treatment other than Decadron for his multiple myeloma. Unfortunately the steroids have caused his blood sugar to elevate. His blood sugar tonight was in the 800s. He was given IV fluids and then IV insulin. He was complaining abdominal pain with nausea therefore CT abdomen was ordered and patient was turned over to the oncoming physician to follow the CT. Galilea Ponce Clinical Impression     No diagnosis found. Disposition     PATIENT REFERRED TO:  No follow-up provider specified.     DISCHARGE MEDICATIONS:  New Prescriptions    No medications on file       DISPOSITION  As per the oncoming physician       Chana Jiang MD  06/26/19 1012

## 2019-06-26 NOTE — ED NOTES
Patient is telling me that he feels he has no strength to stand up. That he feels \"dark\". States that he doesn't feel balanced. \"That he cannot stand up and be functional\".      Nathalie Rivas RN  06/25/19 3948

## 2019-06-26 NOTE — ED NOTES
Magali Jade from [facility] called for an update. Also wanted to inform us that he was indeed a hospice patient.       Rashard Fountain RN  06/26/19 [id number]

## 2019-06-26 NOTE — ED NOTES
Patient discharged to home with family. Patient verbalized understanding of discharge instructions. Advised of when to return to ED and when to call 911. Advised of follow up with PCP. Patient received 1 Rx with education. Patient verbalized understanding of education. No questions from patient to this RN. Patient left ED without incident.         Yesika Monzon RN  06/26/19 4897

## 2019-06-26 NOTE — ED PROVIDER NOTES
[address] [facility] [address] ENCOUNTER          ATTENDING PHYSICIAN NOTE       Date of evaluation: 6/25/2019    ADDENDUM:      Care of this patient was assumed from Dr. Kellen Geronimo. The patient was seen for Hyperglycemia and Abdominal Pain  . The patient's initial evaluation and plan have been discussed with the prior provider who initially evaluated the patient. Nursing Notes, Past Medical Hx, Past Surgical Hx, Social Hx, Allergies, and Family Hx were all reviewed. Patient is a 72-year-old male with a history of multiple myeloma that is recalcitrant to therapy who is on chronic steroids for symptom suppression presents with hyperglycemia and abdominal pain. Patient also recently had his furosemide changed to torsemide to help try to increase his diuresis. On arrival, the patient was found to be hemodynamically stable. Blood sugar was in the 800s with no evidence of DKA. Patient was seen in the emerge department by his oncologist who asked that we try to get the patient's blood sugars under control and start him on Actos. Laboratory studies were otherwise unremarkable except for a creatinine of 2.9 when the patient's baseline is 1.8 to 2. At time of turnover, CT scan of the abdomen and pelvis was pending. Diagnostic Results     RADIOLOGY:  CT ABDOMEN PELVIS WO CONTRAST   Final Result    IMPRESSION:       1. No evidence of any acute abdominal or pelvic process. 2. Uncomplicated sigmoid colon diverticulosis. 3. Fat-containing left inguinal hernia. 4. Multiple lytic lesions are identified within the pelvis, spine, and right lower ribs. The lytic lesions within the pelvis have increased in size in comparison to a CT scan of the pelvis dated 9/18/2018.            LABS:   Results for orders placed or performed during the hospital encounter of 35/49/55   Basic Metabolic Panel   Result Value Ref Range    Sodium 135 (L) 136 - 145 mmol/L    Potassium 4.7 3.5 - 5.1 mmol/L    Chloride 88 (L) 99 - Hernesto Temple is a 59 y.o. male presents to the emergency department for elevated blood sugars and abdominal pain. Patient has a history of multiple myeloma and is currently on steroids to decrease symptoms which is likely the cause of his hyperglycemia. Laboratory studies are significant for an increase of his creatinine but no anion gap. Patient was given IV fluids and insulin with improvement of his blood sugar. CT scan of the abdomen and pelvis shows no acute abnormalities. In discussion with the patient, he did not want to be admitted to the hospital.  I feel most likely the increased renal insufficiency is due to osmotic diuresis from his hyperglycemia as well as the change to the torsemide. I will recommend that he decrease the dose of torsemide to help with this as well as try to do the minimal dose of steroids. The patient will be started on Actos for blood sugar control per request of his oncologist.  Patient will be instructed to follow-up with his oncologist for further evaluation. Clinical Impression     1. Hyperglycemia    2. Generalized abdominal pain    3.  Acute on chronic renal insufficiency        Disposition     PATIENT REFERRED TO:  Juan Francisco Hernadez.,   [facility] [address]  [id number] [facility]  [phone]            DISCHARGE MEDICATIONS:  Discharge Medication List as of 6/26/2019  2:02 AM      START taking these medications    Details   pioglitazone (ACTOS) 15 MG tablet Take 1 tablet by mouth daily, Disp-30 tablet, [id number]             DISPOSITION Decision To Discharge 06/26/2019 02:01:58 AM        Liza Kessler MD  06/26/19 [id number]

## 2019-06-26 NOTE — ED NOTES
Speckled top and Sabrina Naas top urine tubes collected and sent to lab.       Rashard Fountain RN  06/25/19 2000

## 2019-06-26 NOTE — ED NOTES
Patient states that when his K goes out of normal range he gets muscle spasms in his legs. He is experiencing that now.  Will notify MD.      Joaquin Prasad, CEASAR  06/25/19 [id number]

## 2019-07-02 ENCOUNTER — TELEPHONE (OUTPATIENT)
Dept: PULMONOLOGY | Age: 64
End: 2019-07-02

## 2019-07-02 NOTE — TELEPHONE ENCOUNTER
Wife calls to say that patient passed away yesterday, at home. He had decided to quit eating and drinking and taking his meds. Wife says she has support from good friends. Office sympathies extended.

## 2020-10-30 NOTE — PLAN OF CARE
Problem: Bleeding:  Goal: Will show no signs and symptoms of excessive bleeding  Will show no signs and symptoms of excessive bleeding   Outcome: Ongoing  Patient's hemoglobin this AM: 8.1  Patient's platelet count this AM: 12  Pt seen and assessed at [facility]. Seen at [facility] today for 1 unit Platelet transfusion per standing orders for above lab values. Blood products transfused per [facility] policy. Pt tolerated transfusion well and without incident. Vitals stable, SBP >115 at time of discharge. Pt verbalizes understanding of discharge instructions. Discharged ambulatory using cane, accompanied by pt's wife. Reviewed home medications, pt taking Lasix, 20mg prn for swelling to lower leg. Discussed importance of checking BP prior to and after taking lasix, pt's wife reports pt takes Lasix and will experience lightheadedness/dizzines and feels at times that he may pass out. Pt's BP within normal limits and ambulating with steady gait using cane post IV hydration and platelet transfusion. Pt and pt's wife v/u of education. Will send message via Expert to MD Marichuy Caruso as an update regarding BP issue.
no